# Patient Record
Sex: FEMALE | Race: WHITE | Employment: UNEMPLOYED | URBAN - METROPOLITAN AREA
[De-identification: names, ages, dates, MRNs, and addresses within clinical notes are randomized per-mention and may not be internally consistent; named-entity substitution may affect disease eponyms.]

---

## 2019-01-08 ENCOUNTER — TRANSFERRED RECORDS (OUTPATIENT)
Dept: HEALTH INFORMATION MANAGEMENT | Facility: CLINIC | Age: 34
End: 2019-01-08

## 2019-01-08 ENCOUNTER — MEDICAL CORRESPONDENCE (OUTPATIENT)
Dept: HEALTH INFORMATION MANAGEMENT | Facility: CLINIC | Age: 34
End: 2019-01-08

## 2019-01-09 ENCOUNTER — TRANSCRIBE ORDERS (OUTPATIENT)
Dept: MATERNAL FETAL MEDICINE | Facility: CLINIC | Age: 34
End: 2019-01-09

## 2019-01-09 DIAGNOSIS — O26.90 PREGNANCY RELATED CONDITION, ANTEPARTUM: Primary | ICD-10-CM

## 2019-01-29 ENCOUNTER — PRE VISIT (OUTPATIENT)
Dept: MATERNAL FETAL MEDICINE | Facility: CLINIC | Age: 34
End: 2019-01-29

## 2019-02-06 ENCOUNTER — OFFICE VISIT (OUTPATIENT)
Dept: MATERNAL FETAL MEDICINE | Facility: CLINIC | Age: 34
End: 2019-02-06
Attending: OBSTETRICS & GYNECOLOGY
Payer: MEDICAID

## 2019-02-06 ENCOUNTER — HOSPITAL ENCOUNTER (OUTPATIENT)
Dept: ULTRASOUND IMAGING | Facility: CLINIC | Age: 34
Discharge: HOME | End: 2019-02-06
Attending: OBSTETRICS & GYNECOLOGY | Admitting: OBSTETRICS & GYNECOLOGY
Payer: MEDICAID

## 2019-02-06 DIAGNOSIS — Z36.9 FIRST TRIMESTER SCREENING: Primary | ICD-10-CM

## 2019-02-06 DIAGNOSIS — O28.3 ABNORMAL PRENATAL ULTRASOUND: ICD-10-CM

## 2019-02-06 DIAGNOSIS — O26.90 PREGNANCY RELATED CONDITION, ANTEPARTUM: ICD-10-CM

## 2019-02-06 PROCEDURE — 36415 COLL VENOUS BLD VENIPUNCTURE: CPT | Performed by: OBSTETRICS & GYNECOLOGY

## 2019-02-06 PROCEDURE — 40000791 ZZHCL STATISTIC VERIFI PRENATAL TRISOMY 21,18,13: Performed by: OBSTETRICS & GYNECOLOGY

## 2019-02-06 PROCEDURE — 96040 ZZH GENETIC COUNSELING, EACH 30 MINUTES: CPT | Mod: ZF | Performed by: GENETIC COUNSELOR, MS

## 2019-02-06 PROCEDURE — 76813 OB US NUCHAL MEAS 1 GEST: CPT
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
## 2019-02-06 NOTE — PROGRESS NOTES
"Please see \"Imaging\" tab under \"Chart Review\" for details of today's US.      Liz De Luna, DO  Maternal-Fetal Medicine        "

## 2019-02-12 ENCOUNTER — TELEPHONE (OUTPATIENT)
Dept: MATERNAL FETAL MEDICINE | Facility: CLINIC | Age: 34
End: 2019-02-12

## 2019-02-12 NOTE — TELEPHONE ENCOUNTER
Called and discussed normal NIPT results with Radha. Results indicate NO ANEUPLOIDY DETECTED for chromosomes 21, 18, 13, or sex chromosomes (XX). Fetal sex was not disclosed per patient wishes. This puts her current pregnancy at low risk for Down syndrome, trisomy 18, trisomy 13 and sex chromosome abnormalities. This test is reported to have the following sensitivities: Down syndrome- 99%, trisomy 18- 98%, and trisomy 13- 98%. Although these results are reassuring, this does not replace a standard chromosome analysis from a chorionic villus sampling or amniocentesis. Her results are available in her Epic chart for her primary OB to review.    Brittnee Wolfe MS, [facility]  Maternal Fetal Medicine  [phone]

## 2019-02-14 LAB — LAB SCANNED RESULT: NORMAL

## 2019-02-15 ENCOUNTER — HEALTH MAINTENANCE LETTER (OUTPATIENT)
Age: 34
End: 2019-02-15

## 2019-03-01 ENCOUNTER — OFFICE VISIT (OUTPATIENT)
Dept: MATERNAL FETAL MEDICINE | Facility: CLINIC | Age: 34
End: 2019-03-01
Attending: OBSTETRICS & GYNECOLOGY
Payer: MEDICAID

## 2019-03-01 ENCOUNTER — HOSPITAL ENCOUNTER (OUTPATIENT)
Dept: ULTRASOUND IMAGING | Facility: CLINIC | Age: 34
Discharge: HOME | End: 2019-03-01
Attending: OBSTETRICS & GYNECOLOGY | Admitting: OBSTETRICS & GYNECOLOGY
Payer: MEDICAID

## 2019-03-01 DIAGNOSIS — O28.3 INCREASED NUCHAL TRANSLUCENCY SPACE ON FETAL ULTRASOUND: Primary | ICD-10-CM

## 2019-03-01 PROCEDURE — 76805 OB US >/= 14 WKS SNGL FETUS: CPT

## 2019-03-21 ENCOUNTER — HOSPITAL ENCOUNTER (OUTPATIENT)
Facility: CLINIC | Age: 34
End: 2019-03-21
Payer: COMMERCIAL

## 2019-03-27 ENCOUNTER — HOSPITAL ENCOUNTER (OUTPATIENT)
Facility: CLINIC | Age: 34
Discharge: HOME | End: 2019-03-27
Attending: EMERGENCY MEDICINE | Admitting: MIDWIFE
Payer: MEDICAID

## 2019-03-27 VITALS
OXYGEN SATURATION: 98 % | RESPIRATION RATE: 16 BRPM | DIASTOLIC BLOOD PRESSURE: 65 MMHG | SYSTOLIC BLOOD PRESSURE: 116 MMHG | WEIGHT: 163.6 LBS | TEMPERATURE: 98.4 F | HEART RATE: 83 BPM

## 2019-03-27 DIAGNOSIS — Z33.1 PREGNANCY, INCIDENTAL: Primary | ICD-10-CM

## 2019-03-27 PROBLEM — Z36.89 ENCOUNTER FOR TRIAGE IN PREGNANT PATIENT: Status: ACTIVE | Noted: 2019-03-27

## 2019-03-27 LAB
ALBUMIN UR-MCNC: NEGATIVE MG/DL
APPEARANCE UR: CLEAR
BILIRUB UR QL STRIP: NEGATIVE
COLOR UR AUTO: YELLOW
GLUCOSE UR STRIP-MCNC: NEGATIVE MG/DL
HGB UR QL STRIP: NEGATIVE
KETONES UR STRIP-MCNC: NEGATIVE MG/DL
LEUKOCYTE ESTERASE UR QL STRIP: NEGATIVE
MUCOUS THREADS #/AREA URNS LPF: PRESENT /LPF
NITRATE UR QL: NEGATIVE
PH UR STRIP: 7 PH (ref 5–7)
RBC #/AREA URNS AUTO: <1 /HPF (ref 0–2)
SOURCE: ABNORMAL
SP GR UR STRIP: 1.02 (ref 1–1.03)
SPECIMEN SOURCE: NORMAL
SQUAMOUS #/AREA URNS AUTO: 2 /HPF (ref 0–1)
UROBILINOGEN UR STRIP-MCNC: NORMAL MG/DL (ref 0–2)
WBC #/AREA URNS AUTO: 1 /HPF (ref 0–5)
WET PREP SPEC: NORMAL

## 2019-03-27 PROCEDURE — 81001 URINALYSIS AUTO W/SCOPE: CPT | Performed by: EMERGENCY MEDICINE

## 2019-03-27 PROCEDURE — G0463 HOSPITAL OUTPT CLINIC VISIT: HCPCS

## 2019-03-27 PROCEDURE — 25000132 ZZH RX MED GY IP 250 OP 250 PS 637: Performed by: MIDWIFE

## 2019-03-27 PROCEDURE — 87491 CHLMYD TRACH DNA AMP PROBE: CPT | Performed by: MIDWIFE

## 2019-03-27 PROCEDURE — 40000268 ZZH STATISTIC NO CHARGES

## 2019-03-27 PROCEDURE — 87086 URINE CULTURE/COLONY COUNT: CPT | Performed by: MIDWIFE

## 2019-03-27 PROCEDURE — 87591 N.GONORRHOEAE DNA AMP PROB: CPT | Performed by: MIDWIFE

## 2019-03-27 PROCEDURE — 87210 SMEAR WET MOUNT SALINE/INK: CPT | Performed by: MIDWIFE

## 2019-03-27 RX ORDER — SODIUM CHLORIDE 9 MG/ML
INJECTION, SOLUTION INTRAVENOUS CONTINUOUS
Status: DISCONTINUED | OUTPATIENT
Start: 2019-03-27 | End: 2019-03-27 | Stop reason: HOSPADM

## 2019-03-27 RX ORDER — ACETAMINOPHEN 650 MG/1
650 SUPPOSITORY RECTAL EVERY 4 HOURS PRN
Status: DISCONTINUED | OUTPATIENT
Start: 2019-03-27 | End: 2019-03-27 | Stop reason: HOSPADM

## 2019-03-27 RX ORDER — ACETAMINOPHEN 325 MG/1
650 TABLET ORAL EVERY 4 HOURS PRN
Status: DISCONTINUED | OUTPATIENT
Start: 2019-03-27 | End: 2019-03-27 | Stop reason: HOSPADM

## 2019-03-27 RX ORDER — CETIRIZINE HYDROCHLORIDE 10 MG/1
10 TABLET ORAL 2 TIMES DAILY
COMMUNITY

## 2019-03-27 RX ADMIN — ACETAMINOPHEN 650 MG: 325 TABLET, FILM COATED ORAL at 14:35

## 2019-03-27 NOTE — DISCHARGE INSTRUCTIONS
Discharge Instructions for  Undelivered Patients    You were seen for: Abdominal Pain/cramping  We Consulted: Cookie Chowdhury CNM  You had (Test or Medicine):Vaginal and urine cultures sent.    Diet:  regular    Activity:  * As tolerated  Call your provider if you notice:  * Swelling in your face or increased swelling in your hands or legs.  * Headaches that are not relieved by Tylenol (acetaminophen).  * Changes in your vision (blurring; seeing spots or stars).  * Nausea (sick to your stomach) and vomiting (throwing up).  * Weight gain of 5 pounds per week.  * Heartburn that doesn't go away.  * Signs of bladder infection: Pain when you urinate (use the toilet), needing to go more often or more urgently.  * The bag of mcmanus (membrane) breaks, or you notice leaking in your underwear.  * Bright red blood in your underwear.  * Abdominal (lower belly) or stomach pain.  * Contractions (tightenings) more than 6 times in one hour.  * Increase or change in vaginal discharge (note the color and amount).

## 2019-03-27 NOTE — PROGRESS NOTES
"HOSPITAL TRIAGE NOTE  ===================    CHIEF COMPLAINT  ========================  Radha Quick is a 33 year old patient presenting today at 19w5d for evaluation of abdominal pain.    Patient's last menstrual period was 2018.  Estimated Date of Delivery: Aug 16, 2019       HPI  ==================   Pt reports to the [facility]place triage from her clinic appointment by the recommendation of her healthcare provider.    - She reports that yesterday evening during [employer] class she started experiencing a primary right sided discomfort that was coming and going.  She assumed it was just related to baby position so she continued through completion of the class.  It continued throughout the night but she was to sleep through it and get her normal amount of sleep.  She reports a increase in occurrence today and that is what has brought her in.  - Patient states that she noticed what may have been a yeast infection because she had some \"cottage cheese\" type discharge but it resolved on it own without medication or other interventions this past .    - She denies any LOF, Vaginal bleeding, or other changes in vaginal discharge, and vaginal irritation.  - Pt denies fever, burning or irritation while urinating, increase in urge or frequency to urinate.   - She states that she does attend [employer] 2-3 times a week and works as a  at the [employer].  This is a lot more then she has done during previous pregnancies per her report.      Prenatal record and labs reviewed from [facility], through Care Everywhere.    CONTRACTIONS: cramping and every 8-10 minutes  ABDOMINAL PAIN: moderate ache  FETAL MOVEMENT: active    VAGINAL BLEEDING: none  RUPTURE OF MEMBRANES: no  PELVIC PAIN: none    PREGNANCY COMPLICATIONS: none      # Pain Assessment:  Current Pain Score 3/27/2019   Patient currently in pain? yes   Pain score (0-10) -   Pain location -   Pain descriptors Cramping         REVIEW OF " SYSTEMS  =====================  C: NEGATIVE for fever, chills  I: NEGATIVE for worrisome rashes, moles or lesions  E: NEGATIVE for vision changes or irritation  R: NEGATIVE for significant cough or SOB  CV: NEGATIVE for chest pain, palpitations or varicosities  GI: POSITIVE for abdominal pain generalized but more intense on right side   : NEGATIVE for frequency, dysuria, or hematuria  M: NEGATIVE for significant arthralgias or myalgia  N: NEGATIVE for headache, weakness, dizziness or paresthesias  P: NEGATIVE for changes in mood or affect    PROBLEM LIST  ===============  Patient Active Problem List    Diagnosis Date Noted     Encounter for triage in pregnant patient 03/27/2019     Priority: Medium     Post-dates pregnancy 04/02/2013     Priority: Medium     Encounter for supervision of other normal pregnancy 02/18/2013     Priority: Medium     LMP 6/19/12, TRENT 3/26/13  Diagnosis updated by automated process. Provider to review and confirm.         HISTORIES  ==============  ALLERGIES:    No Known Allergies  PAST MEDICAL HISTORY  Past Medical History:   Diagnosis Date     Asthma      Depressive disorder     history of depression 1.5 years ago     Gonorrhea      SOCIAL HISTORY  Social History     Socioeconomic History     Marital status: Single     Spouse name: Not on file     Number of children: Not on file     Years of education: Not on file     Highest education level: Not on file   Occupational History     Not on file   Social Needs     Financial resource strain: Not on file     Food insecurity:     Worry: Not on file     Inability: Not on file     Transportation needs:     Medical: Not on file     Non-medical: Not on file   Tobacco Use     Smoking status: Former Smoker     Types: Cigarettes     Smokeless tobacco: Never Used   Substance and Sexual Activity     Alcohol use: Yes     Comment: when not pregnat     Drug use: No     Sexual activity: Yes     Partners: Male   Lifestyle     Physical activity:     Days  per week: Not on file     Minutes per session: Not on file     Stress: Not on file   Relationships     Social connections:     Talks on phone: Not on file     Gets together: Not on file     Attends [employer] service: Not on file     Active member of club or organization: Not on file     Attends meetings of clubs or organizations: Not on file     Relationship status: Not on file     Intimate partner violence:     Fear of current or ex partner: Not on file     Emotionally abused: Not on file     Physically abused: Not on file     Forced sexual activity: Not on file   Other Topics Concern     Parent/sibling w/ CABG, MI or angioplasty before 65F 55M? Not Asked   Social History Narrative     Not on file       EMPLOYMENT: [employer]   FAMILY HISTORY  Family History   Problem Relation Age of Onset     Cancer Maternal Grandfather      Cancer Paternal Grandfather      OB HISTORY  Obstetric History       T2      L2     SAB0   TAB0   Ectopic0   Multiple0   Live Births2       # Outcome Date GA Lbr Gerardo/2nd Weight Sex Delivery Anes PTL Lv   3 Current            2 Term 13 41w1d 04:50 / 00:09 3.232 kg (7 lb 2 oz) M Vag-Spont IV REGIONAL N JUAN JOSE      Name: NATO CHAVEZ      Apgar1:  9                Apgar5: 9   1 Term 2010 42w0d 03:00 3.402 kg (7 lb 8 oz) M Vag-Spont   JUAN JOSE        Prenatal Labs:   Lab Results   Component Value Date    ABO A 08/15/2012    RH Positive 08/15/2012    AS Negative 08/15/2012    HEPBANG Non-reactive 08/15/2012    TREPAB Negative 08/15/2012    HGB 10.2 (L) 2013    HIV Non- reactive 08/15/2012     Rubella- Immune        EXAM  ============  /64   Pulse 83   Temp 98.4  F (36.9  C) (Oral)   Resp 16   Wt 74.2 kg (163 lb 9.6 oz)   LMP 2018   SpO2 98%   Breastfeeding? No   GENERAL APPEARANCE: healthy, alert and no distress  RESP: lungs clear to auscultation - no rales, rhonchi or wheezes  BREAST: normal without masses, tenderness or nipple discharge  and no palpable axillary masses or adenopathy  CV: regular rates and rhythm, normal S1 S2, no S3 or S4 and no murmur,and no varicosities  ABDOMEN:  soft, nontender, no epigastric pain  SKIN: no suspicious lesions or rashes  NEURO: Denies headache, blurred vision, other vision changes  PSYCH: mentation appears normal. and affect normal/bright    CONTRACTIONS: cramping and every 8-10 minutes per patient nothing noted on TOCO   FETAL HEART TONES: 145 via Doppler       PELVIC EXAM: closed/ long/ Posterior/ firm   BLOOD: no  DISCHARGE: white and thick    ROM: no  ROMPLUS: not done    LABS: UA, UC, Wet prep and GC/ Chlamydia  Lab results reviewed-   Recent Results (from the past 2 hour(s))   UA with Microscopic    Collection Time: 03/27/19  2:20 PM   Result Value Ref Range    Color Urine Yellow     Appearance Urine Clear     Glucose Urine Negative NEG^Negative mg/dL    Bilirubin Urine Negative NEG^Negative    Ketones Urine Negative NEG^Negative mg/dL    Specific Gravity Urine 1.017 1.003 - 1.035    Blood Urine Negative NEG^Negative    pH Urine 7.0 5.0 - 7.0 pH    Protein Albumin Urine Negative NEG^Negative mg/dL    Urobilinogen mg/dL Normal 0.0 - 2.0 mg/dL    Nitrite Urine Negative NEG^Negative    Leukocyte Esterase Urine Negative NEG^Negative    Source Midstream Urine     WBC Urine 1 0 - 5 /HPF    RBC Urine <1 0 - 2 /HPF    Squamous Epithelial /HPF Urine 2 (H) 0 - 1 /HPF    Mucous Urine Present (A) NEG^Negative /LPF   Urine Culture Aerobic Bacterial    Collection Time: 03/27/19  2:20 PM   Result Value Ref Range    Specimen Description Midstream Urine     Special Requests Specimen received in preservative     Culture Micro PENDING    Wet prep    Collection Time: 03/27/19  2:20 PM   Result Value Ref Range    Specimen Description Vagina     Wet Prep Few  PMNs seen       Wet Prep No yeast seen     Wet Prep No motile Trichomonas seen     Wet Prep No clue cells seen        DIAGNOSIS  ============  19w5d seen on the Birthplace  "Triage for increasing abdominal pain x24 hrs   Muscle Skeletal Injury     FHT: 145 via Doppler     Patient Active Problem List   Diagnosis     Encounter for supervision of other normal pregnancy     Post-dates pregnancy     Encounter for triage in pregnant patient       PLAN  ============  - Discharge to home with PTL instructions per discharge instruction form  - Call or return to the Birthplace with contractions, cramping, abdominal or pelvic pain, vaginal bleeding, leaking fluid or decreased fetal movement.  - Discussed recommendation to rest and relax the remainder of the day using interventions for comfort to include but not limited to hydration, tylenol, and warm baths.  - Dicussed discussing with employer removing the use of heavy equipment from her responsibilities during pregnancy and that if the require a letter from a provider to discuss with her provider a [facility]     - Follow up as scheduled on Tuesday April 2, 2019 and PRN concerns or S/S reviewed previously.  - Patient and her mother verbalize understanding and agreement with this collective plan of care.    \"I, Avery Kolb RN, KEON am serving as a scribe to document services personally performed by ALEJA Escobar, SURAJ based on the provider's statements to me.\" -Avery Kolb RN, KEON    The encounter was performed by me and scribed by the SNM.  The scribed note accurately reflects my personal services and decision made by me.    Cookie Chowdhury CNM APRN   "

## 2019-03-27 NOTE — PLAN OF CARE
"Data: Patient admitted to triage  room 1 at 1317 after evaluation of abdominal cramping. Pt was seen at [facility] and sent to ED then ED sent pt to L&D. Patient is a . Prenatal record reviewed.   Obstetric History       T2      L2     SAB0   TAB0   Ectopic0   Multiple0   Live Births2       # Outcome Date GA Lbr Gerardo/2nd Weight Sex Delivery Anes PTL Lv   3 Current            2 Term 13 41w1d 04:50 / 00:09 3.232 kg (7 lb 2 oz) M Vag-Spont IV REGIONAL N JUAN JOSE      Name: NATO CHAVEZ      Apgar1:  9                Apgar5: 9   1 Term 2010 42w0d 03:00 3.402 kg (7 lb 8 oz) M Vag-Spont   JUAN JOSE      .  Medical History:   Past Medical History:   Diagnosis Date     Asthma      Depressive disorder     history of depression 1.5 years ago     Gonorrhea    .  Gestational age 19w5d. Vital signs per doc flowsheet. Patient reports Abdominal Pain (19 weeks preg. sent from clinic. does derrell regularly. last night developed RLQ pain after derrell. today wakes up with \"all over belly pain\". denies vomiting or diarrhea. + FM. no vaginal discharge. )   as reason for admission. Support persons pt's mother, present.  Action: Care of patient assumed at 1320. Verbal consent for Alem MILTON of . Admission assessment completed  Response: Cookie Chowdhury CNM informed of arrival, orders placed and vaginal and urine cultures sent. Plan per provider is Tylenol for abdominal pain. Send cultures to lab. Patient verbalized understanding of education and verbalized agreement with plan.     U/A negative. Culture results pending, discharged home undelivered with instructions at 1516.  "

## 2019-03-28 ENCOUNTER — NURSE TRIAGE (OUTPATIENT)
Dept: NURSING | Facility: CLINIC | Age: 34
End: 2019-03-28

## 2019-03-28 ENCOUNTER — HOSPITAL ENCOUNTER (OUTPATIENT)
Dept: ULTRASOUND IMAGING | Facility: CLINIC | Age: 34
Discharge: HOME | End: 2019-03-28
Attending: OBSTETRICS & GYNECOLOGY | Admitting: OBSTETRICS & GYNECOLOGY
Payer: MEDICAID

## 2019-03-28 ENCOUNTER — OFFICE VISIT (OUTPATIENT)
Dept: MATERNAL FETAL MEDICINE | Facility: CLINIC | Age: 34
End: 2019-03-28
Attending: OBSTETRICS & GYNECOLOGY
Payer: MEDICAID

## 2019-03-28 DIAGNOSIS — O28.3 INCREASED NUCHAL TRANSLUCENCY SPACE ON FETAL ULTRASOUND: Primary | ICD-10-CM

## 2019-03-28 DIAGNOSIS — O28.3 INCREASED NUCHAL TRANSLUCENCY SPACE ON FETAL ULTRASOUND: ICD-10-CM

## 2019-03-28 LAB
BACTERIA SPEC CULT: NORMAL
BACTERIA SPEC CULT: NORMAL
C TRACH DNA SPEC QL NAA+PROBE: NEGATIVE
Lab: NORMAL
N GONORRHOEA DNA SPEC QL NAA+PROBE: NEGATIVE
SPECIMEN SOURCE: NORMAL

## 2019-03-28 PROCEDURE — 76811 OB US DETAILED SNGL FETUS: CPT

## 2019-03-28 NOTE — PROGRESS NOTES
"Please see \"Imaging\" tab under Chart Review for full details.    Genevieve Zamarripa MD  Maternal Fetal Medicine    "

## 2019-03-28 NOTE — TELEPHONE ENCOUNTER
Radha was seen in OB yesterday for abdominal pain. No significant findings so she was discharged.  She was instructed upon discharge to give it 24 hours and see how it is.  Per instructions if pain persists call or return to the [facility]Place.  Radha continues to have abdominal pain. Yesterday the pain was on the right side of her abdomen. It's now her upper abdomen. She stated, even with Tylenol on board, her pain is 7/10.  I recommended she return to the [facility]Place. She stated understanding and agreement.  Cary SCHAEFFER RN [facility]

## 2019-04-10 ENCOUNTER — OFFICE VISIT (OUTPATIENT)
Dept: MATERNAL FETAL MEDICINE | Facility: CLINIC | Age: 34
End: 2019-04-10
Attending: OBSTETRICS & GYNECOLOGY
Payer: MEDICAID

## 2019-04-10 ENCOUNTER — HOSPITAL ENCOUNTER (OUTPATIENT)
Dept: ULTRASOUND IMAGING | Facility: CLINIC | Age: 34
Discharge: HOME | End: 2019-04-10
Attending: OBSTETRICS & GYNECOLOGY | Admitting: OBSTETRICS & GYNECOLOGY
Payer: MEDICAID

## 2019-04-10 DIAGNOSIS — O28.3 INCREASED NUCHAL TRANSLUCENCY SPACE ON FETAL ULTRASOUND: Primary | ICD-10-CM

## 2019-04-10 DIAGNOSIS — O28.3 INCREASED NUCHAL TRANSLUCENCY SPACE ON FETAL ULTRASOUND: ICD-10-CM

## 2019-04-10 PROCEDURE — 76825 ECHO EXAM OF FETAL HEART: CPT

## 2019-04-10 NOTE — PROGRESS NOTES
"Please see \"Imaging\" tab under \"Chart Review\" for details of today's US.    Jeannette Jalloh, DO    "

## 2019-05-14 LAB
GLU GEST SCREEN 1HR 50G: 119
TREPONEMA ANTIBODIES: NORMAL

## 2019-07-16 LAB — GROUP B STREP PCR: NEGATIVE

## 2019-07-22 LAB — HIV 1+2 AB+HIV1 P24 AG SERPL QL IA: NORMAL

## 2019-08-23 ENCOUNTER — HOSPITAL ENCOUNTER (INPATIENT)
Facility: CLINIC | Age: 34
LOS: 2 days | Discharge: HOME | End: 2019-08-25
Attending: ADVANCED PRACTICE MIDWIFE | Admitting: FAMILY MEDICINE
Payer: COMMERCIAL

## 2019-08-23 PROBLEM — E55.9 VITAMIN D INSUFFICIENCY: Status: ACTIVE | Noted: 2019-01-15

## 2019-08-23 PROBLEM — Z34.80 SUPERVISION OF OTHER NORMAL PREGNANCY, ANTEPARTUM: Status: ACTIVE | Noted: 2018-12-26

## 2019-08-23 PROBLEM — J30.89 ENVIRONMENTAL AND SEASONAL ALLERGIES: Status: ACTIVE | Noted: 2018-10-23

## 2019-08-23 PROBLEM — Z86.59 HISTORY OF POSTPARTUM DEPRESSION, CURRENTLY PREGNANT: Status: ACTIVE | Noted: 2019-07-02

## 2019-08-23 PROBLEM — F32.2 MAJOR DEPRESSIVE DISORDER, SINGLE EPISODE, SEVERE WITHOUT PSYCHOTIC FEATURES (H): Status: ACTIVE | Noted: 2017-05-22

## 2019-08-23 PROBLEM — O99.891 HISTORY OF POSTPARTUM DEPRESSION, CURRENTLY PREGNANT: Status: ACTIVE | Noted: 2019-07-02

## 2019-08-23 PROBLEM — O28.9 ABNORMAL FIRST TRIMESTER SCREEN: Status: ACTIVE | Noted: 2019-03-01

## 2019-08-23 PROBLEM — A04.8 HELICOBACTER PYLORI INFECTION: Status: ACTIVE | Noted: 2019-04-01

## 2019-08-23 PROBLEM — Z34.83 SUPERVISION OF NORMAL INTRAUTERINE PREGNANCY IN MULTIGRAVIDA IN THIRD TRIMESTER: Status: ACTIVE | Noted: 2019-08-23

## 2019-08-23 LAB
ABO + RH BLD: NORMAL
ABO + RH BLD: NORMAL
BASOPHILS # BLD AUTO: 0 10E9/L (ref 0–0.2)
BASOPHILS NFR BLD AUTO: 0.1 %
BLD GP AB SCN SERPL QL: NORMAL
BLOOD BANK CMNT PATIENT-IMP: NORMAL
DIFFERENTIAL METHOD BLD: ABNORMAL
EOSINOPHIL # BLD AUTO: 0.1 10E9/L (ref 0–0.7)
EOSINOPHIL NFR BLD AUTO: 1.2 %
ERYTHROCYTE [DISTWIDTH] IN BLOOD BY AUTOMATED COUNT: 13.3 % (ref 10–15)
HCT VFR BLD AUTO: 33.5 % (ref 35–47)
HGB BLD-MCNC: 11.2 G/DL (ref 11.7–15.7)
IMM GRANULOCYTES # BLD: 0 10E9/L (ref 0–0.4)
IMM GRANULOCYTES NFR BLD: 0.4 %
LYMPHOCYTES # BLD AUTO: 2.3 10E9/L (ref 0.8–5.3)
LYMPHOCYTES NFR BLD AUTO: 24.6 %
MCH RBC QN AUTO: 30.2 PG (ref 26.5–33)
MCHC RBC AUTO-ENTMCNC: 33.4 G/DL (ref 31.5–36.5)
MCV RBC AUTO: 90 FL (ref 78–100)
MONOCYTES # BLD AUTO: 0.4 10E9/L (ref 0–1.3)
MONOCYTES NFR BLD AUTO: 4.6 %
NEUTROPHILS # BLD AUTO: 6.5 10E9/L (ref 1.6–8.3)
NEUTROPHILS NFR BLD AUTO: 69.1 %
NRBC # BLD AUTO: 0 10*3/UL
NRBC BLD AUTO-RTO: 0 /100
PLATELET # BLD AUTO: 196 10E9/L (ref 150–450)
RBC # BLD AUTO: 3.71 10E12/L (ref 3.8–5.2)
SPECIMEN EXP DATE BLD: NORMAL
WBC # BLD AUTO: 9.4 10E9/L (ref 4–11)

## 2019-08-23 PROCEDURE — 25000132 ZZH RX MED GY IP 250 OP 250 PS 637: Performed by: STUDENT IN AN ORGANIZED HEALTH CARE EDUCATION/TRAINING PROGRAM

## 2019-08-23 PROCEDURE — 86900 BLOOD TYPING SEROLOGIC ABO: CPT | Performed by: FAMILY MEDICINE

## 2019-08-23 PROCEDURE — 25000132 ZZH RX MED GY IP 250 OP 250 PS 637: Performed by: ADVANCED PRACTICE MIDWIFE

## 2019-08-23 PROCEDURE — 25000128 H RX IP 250 OP 636: Performed by: STUDENT IN AN ORGANIZED HEALTH CARE EDUCATION/TRAINING PROGRAM

## 2019-08-23 PROCEDURE — 12000001 ZZH R&B MED SURG/OB UMMC

## 2019-08-23 PROCEDURE — 86850 RBC ANTIBODY SCREEN: CPT | Performed by: FAMILY MEDICINE

## 2019-08-23 PROCEDURE — 85025 COMPLETE CBC W/AUTO DIFF WBC: CPT | Performed by: FAMILY MEDICINE

## 2019-08-23 PROCEDURE — 86780 TREPONEMA PALLIDUM: CPT | Performed by: FAMILY MEDICINE

## 2019-08-23 PROCEDURE — 3E0P7VZ INTRODUCTION OF HORMONE INTO FEMALE REPRODUCTIVE, VIA NATURAL OR ARTIFICIAL OPENING: ICD-10-PCS | Performed by: ADVANCED PRACTICE MIDWIFE

## 2019-08-23 PROCEDURE — 86901 BLOOD TYPING SEROLOGIC RH(D): CPT | Performed by: FAMILY MEDICINE

## 2019-08-23 PROCEDURE — 87653 STREP B DNA AMP PROBE: CPT | Performed by: ADVANCED PRACTICE MIDWIFE

## 2019-08-23 RX ORDER — CARBOPROST TROMETHAMINE 250 UG/ML
250 INJECTION, SOLUTION INTRAMUSCULAR
Status: DISCONTINUED | OUTPATIENT
Start: 2019-08-23 | End: 2019-08-24

## 2019-08-23 RX ORDER — ONDANSETRON 2 MG/ML
4 INJECTION INTRAMUSCULAR; INTRAVENOUS EVERY 6 HOURS PRN
Status: DISCONTINUED | OUTPATIENT
Start: 2019-08-23 | End: 2019-08-24

## 2019-08-23 RX ORDER — ACETAMINOPHEN 325 MG/1
650 TABLET ORAL EVERY 4 HOURS PRN
COMMUNITY
Start: 2019-07-16

## 2019-08-23 RX ORDER — SODIUM CHLORIDE, SODIUM LACTATE, POTASSIUM CHLORIDE, CALCIUM CHLORIDE 600; 310; 30; 20 MG/100ML; MG/100ML; MG/100ML; MG/100ML
INJECTION, SOLUTION INTRAVENOUS CONTINUOUS
Status: DISCONTINUED | OUTPATIENT
Start: 2019-08-23 | End: 2019-08-24

## 2019-08-23 RX ORDER — CHOLECALCIFEROL (VITAMIN D3) 50 MCG
2000 TABLET ORAL DAILY
COMMUNITY
Start: 2019-01-15 | End: 2021-07-22

## 2019-08-23 RX ORDER — FERROUS SULFATE 325(65) MG
325 TABLET ORAL
Status: DISCONTINUED | OUTPATIENT
Start: 2019-08-24 | End: 2019-08-25 | Stop reason: HOSPADM

## 2019-08-23 RX ORDER — FENTANYL CITRATE 50 UG/ML
50-100 INJECTION, SOLUTION INTRAMUSCULAR; INTRAVENOUS
Status: DISCONTINUED | OUTPATIENT
Start: 2019-08-23 | End: 2019-08-24

## 2019-08-23 RX ORDER — HYDROXYZINE HYDROCHLORIDE 50 MG/1
100 TABLET, FILM COATED ORAL
Status: COMPLETED | OUTPATIENT
Start: 2019-08-23 | End: 2019-08-23

## 2019-08-23 RX ORDER — MISOPROSTOL 100 UG/1
25 TABLET ORAL EVERY 4 HOURS PRN
Status: DISCONTINUED | OUTPATIENT
Start: 2019-08-23 | End: 2019-08-24

## 2019-08-23 RX ORDER — IBUPROFEN 200 MG
400-600 TABLET ORAL EVERY 6 HOURS PRN
COMMUNITY
Start: 2019-07-16 | End: 2021-07-22

## 2019-08-23 RX ORDER — FERROUS SULFATE 325(65) MG
325 TABLET ORAL DAILY
COMMUNITY
Start: 2019-05-17 | End: 2021-07-22

## 2019-08-23 RX ORDER — MORPHINE SULFATE 10 MG/ML
10 INJECTION, SOLUTION INTRAMUSCULAR; INTRAVENOUS
Status: DISCONTINUED | OUTPATIENT
Start: 2019-08-23 | End: 2019-08-24

## 2019-08-23 RX ORDER — METHYLERGONOVINE MALEATE 0.2 MG/ML
200 INJECTION INTRAVENOUS
Status: DISCONTINUED | OUTPATIENT
Start: 2019-08-23 | End: 2019-08-24

## 2019-08-23 RX ORDER — OLOPATADINE HYDROCHLORIDE 1 MG/ML
1 SOLUTION/ DROPS OPHTHALMIC 2 TIMES DAILY
COMMUNITY
Start: 2018-03-29 | End: 2021-07-22

## 2019-08-23 RX ORDER — NALOXONE HYDROCHLORIDE 0.4 MG/ML
.1-.4 INJECTION, SOLUTION INTRAMUSCULAR; INTRAVENOUS; SUBCUTANEOUS
Status: DISCONTINUED | OUTPATIENT
Start: 2019-08-23 | End: 2019-08-24

## 2019-08-23 RX ORDER — HYDROXYZINE HYDROCHLORIDE 50 MG/ML
50-100 INJECTION, SOLUTION INTRAMUSCULAR
Status: DISCONTINUED | OUTPATIENT
Start: 2019-08-23 | End: 2019-08-24

## 2019-08-23 RX ORDER — CETIRIZINE HYDROCHLORIDE 10 MG/1
10 TABLET ORAL DAILY
Status: ON HOLD | COMMUNITY
End: 2019-08-23

## 2019-08-23 RX ORDER — OLOPATADINE HYDROCHLORIDE 1 MG/ML
1 SOLUTION/ DROPS OPHTHALMIC 2 TIMES DAILY
Status: DISCONTINUED | OUTPATIENT
Start: 2019-08-23 | End: 2019-08-25 | Stop reason: HOSPADM

## 2019-08-23 RX ORDER — OXYTOCIN/0.9 % SODIUM CHLORIDE 30/500 ML
100-340 PLASTIC BAG, INJECTION (ML) INTRAVENOUS CONTINUOUS PRN
Status: COMPLETED | OUTPATIENT
Start: 2019-08-23 | End: 2019-08-24

## 2019-08-23 RX ORDER — IBUPROFEN 800 MG/1
800 TABLET, FILM COATED ORAL
Status: COMPLETED | OUTPATIENT
Start: 2019-08-23 | End: 2019-08-24

## 2019-08-23 RX ORDER — CETIRIZINE HYDROCHLORIDE 10 MG/1
10 TABLET ORAL 2 TIMES DAILY
Status: DISCONTINUED | OUTPATIENT
Start: 2019-08-23 | End: 2019-08-25 | Stop reason: HOSPADM

## 2019-08-23 RX ORDER — OXYTOCIN 10 [USP'U]/ML
10 INJECTION, SOLUTION INTRAMUSCULAR; INTRAVENOUS
Status: DISCONTINUED | OUTPATIENT
Start: 2019-08-23 | End: 2019-08-24

## 2019-08-23 RX ORDER — NICOTINE POLACRILEX 4 MG/1
20 GUM, CHEWING ORAL 2 TIMES DAILY
COMMUNITY
Start: 2019-04-10 | End: 2021-07-22

## 2019-08-23 RX ORDER — LIDOCAINE 40 MG/G
CREAM TOPICAL
Status: DISCONTINUED | OUTPATIENT
Start: 2019-08-23 | End: 2019-08-24

## 2019-08-23 RX ORDER — DOCUSATE SODIUM 100 MG/1
100 CAPSULE, LIQUID FILLED ORAL 2 TIMES DAILY PRN
COMMUNITY
Start: 2013-04-11 | End: 2021-07-22

## 2019-08-23 RX ORDER — FERROUS SULFATE 325(65) MG
325 TABLET ORAL
COMMUNITY
End: 2021-07-22

## 2019-08-23 RX ORDER — ACETAMINOPHEN 325 MG/1
650 TABLET ORAL EVERY 4 HOURS PRN
Status: DISCONTINUED | OUTPATIENT
Start: 2019-08-23 | End: 2019-08-24

## 2019-08-23 RX ORDER — OXYCODONE AND ACETAMINOPHEN 5; 325 MG/1; MG/1
1 TABLET ORAL
Status: DISCONTINUED | OUTPATIENT
Start: 2019-08-23 | End: 2019-08-25 | Stop reason: HOSPADM

## 2019-08-23 RX ORDER — MISOPROSTOL 100 UG/1
25 TABLET ORAL
Status: DISCONTINUED | OUTPATIENT
Start: 2019-08-23 | End: 2019-08-23

## 2019-08-23 RX ADMIN — Medication 25 MCG: at 16:44

## 2019-08-23 RX ADMIN — Medication 25 MCG: at 10:22

## 2019-08-23 RX ADMIN — HYDROXYZINE HYDROCHLORIDE 100 MG: 50 TABLET, FILM COATED ORAL at 23:39

## 2019-08-23 RX ADMIN — MORPHINE SULFATE 10 MG: 10 INJECTION INTRAVENOUS at 23:39

## 2019-08-23 RX ADMIN — Medication 25 MCG: at 14:27

## 2019-08-23 RX ADMIN — OMEPRAZOLE 20 MG: 20 CAPSULE, DELAYED RELEASE ORAL at 13:49

## 2019-08-23 RX ADMIN — CETIRIZINE HYDROCHLORIDE 10 MG: 10 TABLET, FILM COATED ORAL at 13:50

## 2019-08-23 RX ADMIN — Medication 25 MCG: at 12:38

## 2019-08-23 NOTE — PROGRESS NOTES
[facility]  Intrapartum Progress Note  2019 1:02 PM  Date of service: 2019.    SUBJECTIVE:  Doing well. Patient reports contractions infrequently (not painful) and denies vaginal bleeding or loss of fluids. Fetal movement is Normal. She has been getting up and walking around and doing squats to get things moving.    OBJECTIVE:   Patient Vitals for the past 8 hrs:   BP Temp Temp src Heart Rate Resp   19 1240 129/79 -- -- 90 18   19 0848 117/64 -- -- -- --   19 0839 -- 98.4  F (36.9  C) Oral -- 16     Gen: no apparent distress, resting comfortably    Sterile Vaginal Exam:  Membranes: intact  Cervix: not checked  Presentation:Cephalic    FHT: Baseline 150 bpm. Variability is  moderate (5-25 bpm),  Accelerations are Present, decelerations are Absent  TOCO: Contractions infrequently.  Tracing is Category 1          ASSESSMENT and PLAN:  Radha Quick is a 34 year old  at 41w0d not in labor. Membranes are intact.   Patient Active Problem List   Diagnosis     Encounter for supervision of other normal pregnancy     Post-dates pregnancy     Encounter for triage in pregnant patient     Supervision of normal intrauterine pregnancy in multigravida in third trimester     Environmental and seasonal allergies     Helicobacter pylori infection     History of postpartum depression, currently pregnant     Major depressive disorder, single episode, severe without psychotic features (H)     Bety     Vitamin D insufficiency     Supervision of other normal pregnancy, antepartum     Abnormal first trimester screen       1.  Labor: induced with cytotec for 2 doses.       Continue expectant management.  Plan to reassess in two hours.   2.  Fetal Heart Rate Tracing: category one  3.  GBS negative.  Antibiotics are not indicated.  4.  Pain Management: Not currently in pain    # Pain Assessment:  Current Pain Score 2019   Patient currently in pain? denies   Pain score  (0-10) -   Pain location -   Pain descriptors -   Radha s pain level was assessed and she currently denies pain.        Mario Ojeda MD

## 2019-08-23 NOTE — PLAN OF CARE
Data: Radha Quick admitted for induction of labor with cervical ripening for postdates. EFM applied, NST reactive. Cervix 0/30/-2. Late deceleration noted ~1335, spontaneous resolution, baby reactive following that.   Intervention: Misoprostol given PO q 2 hours, x3 doses.    Plan: Continuous EFM, vital signs as ordered, assess for signs of uterine tachysystole.

## 2019-08-23 NOTE — PROVIDER NOTIFICATION
08/23/19 1500   Provider Notification   Provider Name/Title Dr Ojeda   Method of Notification In Department   Notification Reason Status Update     Patient received third misoprostol PO. One late deceleration noted ~1330, reactive since that time. Will continue to monitor and support.

## 2019-08-23 NOTE — PROGRESS NOTES
[facility]  Intrapartum Progress Note  2019 3:31 PM  Date of service: 2019.    SUBJECTIVE:  Doing well. Patient reports contractions occasionally and denies vaginal bleeding or loss of fluids. Fetal movement is Normal.    OBJECTIVE:   Patient Vitals for the past 8 hrs:   BP Temp Temp src Pulse Heart Rate Resp   19 1429 122/68 97.7  F (36.5  C) Oral 76 -- 16   19 1240 129/79 -- -- -- 90 18   19 0848 117/64 -- -- -- -- --   19 0839 -- 98.4  F (36.9  C) Oral -- -- 16     Gen: no apparent distress, resting comfortably  Abd: Gravid.    Sterile Vaginal Exam:  Membranes: intact  Cervix: 0.5// posterior   Consistency: average  Presentation:Cephalic    FHT: Baseline 140 bpm. Variability is  moderate (5-25 bpm),  Accelerations are Present, decelerations are Present (1 prolonged decel around 1330 with good tracing since)  TOCO: Contractions every 5-15 minutes.  Tracing is Category 2        .    ASSESSMENT and PLAN:  Radha Quick is a 34 year old  at 41w0d not in labor.   Patient Active Problem List   Diagnosis     Encounter for supervision of other normal pregnancy     Post-dates pregnancy     Encounter for triage in pregnant patient     Supervision of normal intrauterine pregnancy in multigravida in third trimester     Environmental and seasonal allergies     Helicobacter pylori infection     History of postpartum depression, currently pregnant     Major depressive disorder, single episode, severe without psychotic features (H)     Bety     Vitamin D insufficiency     Supervision of other normal pregnancy, antepartum     Abnormal first trimester screen       1.  Labor: induced with oral cytotec for 3 doses. minimal/no progress.    - Will transition to vaginal cytotec now and consider khan catheter if further dilation seen with vaginal miso       Continue expectant management.  Plan to reassess in two hours.   2.  IM Morphine and Vistaril ordered  at bedtime prn for sleep overnight  3.  Fetal Heart Rate Tracing: category two (one isolated late decel at 1330, otherwise great, reactive tracing)  4.  GBS negative.  Antibiotics are not indicated.  5.  Pain Management: Still undecided    # Pain Assessment:  Current Pain Score 8/23/2019   Patient currently in pain? denies   Pain score (0-10) -   Pain location -   Pain descriptors -   Radha salinas pain level was assessed and she currently denies pain.        Mario Ojeda MD     I agree with the PFSH and ROS as completed by the student, except for changes made by me. The remainder of the encounter was performed by me and scribed by the student. The scribed note accurately reflects my personal services and decisions made by me.  Eri Guillen, SILVANO, CNM, APRN

## 2019-08-23 NOTE — PROGRESS NOTES
Data: Patient admitted to room 458 at 0800. Patient is a . Prenatal record reviewed.   OB History    Para Term  AB Living   3 2 2 0 0 2   SAB TAB Ectopic Multiple Live Births   0 0 0 0 2      # Outcome Date GA Lbr Gerardo/2nd Weight Sex Delivery Anes PTL Lv   3 Current            2 Term 13 41w1d 04:50 / 00:09 3.232 kg (7 lb 2 oz) M Vag-Spont IV REGIONAL N JUAN JOSE      Name: NATO CHAVEZ      Apgar1: 9  Apgar5: 9   1 Term 2010 42w0d 03:00 3.402 kg (7 lb 8 oz) M Vag-Spont   JUAN JOSE      Birth Comments: NICU x 3 days   .  Medical History:   Past Medical History:   Diagnosis Date     Asthma      Depressive disorder     history of depression 1.5 years ago     Gonorrhea    .  Gestational age 41w0d. Vital signs per doc flowsheet. Fetal movement present. Patient presents with Induction Of Labor (post dates)   as reason for admission. Support persons Monty (significant other) and Antony (child) present.  Action:  Verbal consent for EFM, external fetal monitors applied. Admission assessment completed. Patient and support persons educated on labor process. Patient instructed to report change in fetal movement, contractions, vaginal leaking of fluid or bleeding, abdominal pain, or any concerns related to the pregnancy to her nurse/physician. Patient oriented to room, call light in reach.   Response:  Patient verbalized understanding of education and verbalized agreement with plan.

## 2019-08-23 NOTE — H&P
[facility]  Ob Admit /Triage Note    Radha Quick MRN# [id number]   Age: 34 year old YOB: 1985     Date of Admission: 2019 10:01 AM  Date of service: 2019.       History of Present Illness (Resident / Clinician):   Radha Quick is a patient of [facility].   She is a 34 year old  who is 41w0d pregnant with TRENT  Aug 16, 2019, by Patient's last menstrual period was 2018. that is consistent with 6 week ultrasound.    [facility] for [facility] of labor, indication post-dates.    She reports irregular contractions starting yesterday, and occurring every 15 minutes for 3 hour periods of time. She denies fluid leakage. She denies bleeding per vagina. Fetal movement is normal. Seen in clinic one week ago with cervical check at 0.5/30/-4, cephalic by leopold's at that time.    Her prenatal course has been complicated by  Increased nuchal lucency seen on ultrasound but evaluated by MFM and had normal free cell testing, and by H. Pylori diagnosed in pregnancy treated with Omeprazole.    Prenatal labs   Lab Results   Component Value Date    ABO A 08/15/2012    RH Positive 08/15/2012    AS Negative 08/15/2012    HEPBANG Non-reactive 08/15/2012    CHPCRT Negative 2019    GCPCRT Negative 2019    TREPAB Negative 08/15/2012    RUBELLAABIGG Positive 08/15/2012    HGB 10.2 (L) 2013    HIV Non- reactive 08/15/2012    GBS Negative 2013       GBS was collected on 2019.  Weight gain during pregnancy:  25 lbs.              Obstetrical History:   She is a 34 year old   Her OB history:   OB History    Para Term  AB Living   3 2 2 0 0 2   SAB TAB Ectopic Multiple Live Births   0 0 0 0 2      # Outcome Date GA Lbr Gerardo/2nd Weight Sex Delivery Anes PTL Lv   3 Current            2 Term 13 41w1d 04:50 / 00:09 3.232 kg (7 lb 2 oz) M Vag-Spont IV REGIONAL N  JUAN JOSE      Name: NATO CHAVEZ      Apgar1: 9  Apgar5: 9   1 Term 01/2010 42w0d 03:00 3.402 kg (7 lb 8 oz) M Vag-Spont   JUAN JOSE      Birth Comments: NICU x 3 days              Immunzations:     Most Recent Immunizations   Administered Date(s) Administered     TDAP Vaccine (Boostrix) 03/25/2013     Tdap this pregnancy?: YES - Date: 6/18/2019  Flu shot this pregnancy? No, but given just prior to pregnancy in 10/2018         Past Medical History:     Past Medical History:   Diagnosis Date     Asthma      Depressive disorder     history of depression 1.5 years ago     Gonorrhea             Past Surgical History:   History reviewed. No pertinent surgical history.         Family History:     Family History   Problem Relation Age of Onset     Cancer Maternal Grandfather      Cancer Paternal Grandfather             Social History:   no tobacco use  no alcohol use  no illicit drug use         Medications:     No current facility-administered medications on file prior to encounter.   Current Outpatient Medications on File Prior to Encounter:  cetirizine (ZYRTEC) 10 MG tablet Take 10 mg by mouth 2 times daily    omeprazole 20 MG tablet Take 20 mg by mouth 2 times daily   Prenatal vitamin  s (DIS) TABS Take 1 tablet by mouth daily.   order for [facility] order            Allergies:   Patient has no known allergies.         Review of Systems:   CONSTITUTIONAL: no fatigue, no unexpected change in weight  SKIN: no worrisome rashes or lesions  EYES: no acute vision problems or changes  ENT: no ear problems, no mouth problems, no throat problems  RESP: no significant cough, no shortness of breath  CV: no chest pain, no palpitations, no new or worsening peripheral edema  GI: positive for abdominal pain, negative for nausea or vomiting  : no frequency, no dysuria, no hematuria, no discharge, vaginal bleeding, loss of fluid  NEURO: no weakness, no dizziness, no headaches  ENDOCRINE: no temperature intolerance, no skin/hair  changes  PSYCHIATRIC: NEGATIVE for changes in mood or trouble with sleep         Physical Exam:   Vitals:   Temp 98.4  F (36.9  C) (Oral)   Resp 16   LMP 2018   0 lbs 0 oz  There is no height or weight on file to calculate BMI.    GEN: Awake, alert in no apparent distress   HEENT: grossly normal  RESPIRATORY: clear to auscultation bilaterally, no increased work of breathing  BACK:  no costovertebral angle tenderness   CARDIOVASCULAR: RRR, no murmur  ABDOMEN: gravid,  vertex by Leopold's  PELVIC:  no fluid noted, no blood noted.  Presenting part: Head.  Cervix: 0.5/30/-4  EXT:  no edema or calf tenderness  EFW on Exam: 3000g  Confirmed VTX by Ultrasound? Yes    NST interpretation:  Ordered by  Denis Ojeda MD; PGY-2  Start time: 0955   Stop time: 1015  External Monitor, FHT: Baseline 145 bpm. Variability is  moderate (5-25 bpm),  Accelerations are Present, decelerations are Absent. TOCO: Contractions infrequent. Tracing is Category 1.  Interpretation: reactive      Assessment and Plan:   Assessment:   Radha Quick is a 34 year old  at 41w0d not in labor. Membranes are intact. GBS status is Negative.   Patient Active Problem List   Diagnosis     Encounter for supervision of other normal pregnancy     Post-dates pregnancy     Encounter for triage in pregnant patient     Supervision of normal intrauterine pregnancy in multigravida in third trimester         Plan:   - Admit - see IP orders  cervical ripening with oral misoprostol  Anticipate   - Will notify Clinic, [facility]  - Pain: undecided and not currently in uncomfortable pain, has used both epidural and fentanyl in the past    # Pain Assessment:  Current Pain Score 2019   Patient currently in pain? no   Pain score (0-10) -   Pain location -   Pain descriptors -   - Radha is experiencing pain due to labor. Pain management was discussed and the plan was created in a collaborative fashion.  Radha's response to  the current recommendations: engaged  - Please see the plan for pain management as documented above        Mario Ojeda MD

## 2019-08-24 LAB
ALT SERPL W P-5'-P-CCNC: 26 U/L (ref 0–50)
AST SERPL W P-5'-P-CCNC: 30 U/L (ref 0–45)
CREAT SERPL-MCNC: 0.42 MG/DL (ref 0.52–1.04)
ERYTHROCYTE [DISTWIDTH] IN BLOOD BY AUTOMATED COUNT: 13.1 % (ref 10–15)
GFR SERPL CREATININE-BSD FRML MDRD: >90 ML/MIN/{1.73_M2}
GP B STREP DNA SPEC QL NAA+PROBE: NEGATIVE
HCT VFR BLD AUTO: 34.7 % (ref 35–47)
HGB BLD-MCNC: 11.8 G/DL (ref 11.7–15.7)
MCH RBC QN AUTO: 30.5 PG (ref 26.5–33)
MCHC RBC AUTO-ENTMCNC: 34 G/DL (ref 31.5–36.5)
MCV RBC AUTO: 90 FL (ref 78–100)
PLATELET # BLD AUTO: 205 10E9/L (ref 150–450)
RBC # BLD AUTO: 3.87 10E12/L (ref 3.8–5.2)
SPECIMEN SOURCE: NORMAL
T PALLIDUM AB SER QL: NONREACTIVE
URATE SERPL-MCNC: 4.4 MG/DL (ref 2.6–6)
WBC # BLD AUTO: 24 10E9/L (ref 4–11)

## 2019-08-24 PROCEDURE — 12000001 ZZH R&B MED SURG/OB UMMC

## 2019-08-24 PROCEDURE — 25000128 H RX IP 250 OP 636: Performed by: STUDENT IN AN ORGANIZED HEALTH CARE EDUCATION/TRAINING PROGRAM

## 2019-08-24 PROCEDURE — 25000132 ZZH RX MED GY IP 250 OP 250 PS 637: Performed by: ADVANCED PRACTICE MIDWIFE

## 2019-08-24 PROCEDURE — 84450 TRANSFERASE (AST) (SGOT): CPT | Performed by: ADVANCED PRACTICE MIDWIFE

## 2019-08-24 PROCEDURE — 84460 ALANINE AMINO (ALT) (SGPT): CPT | Performed by: ADVANCED PRACTICE MIDWIFE

## 2019-08-24 PROCEDURE — 85027 COMPLETE CBC AUTOMATED: CPT | Performed by: ADVANCED PRACTICE MIDWIFE

## 2019-08-24 PROCEDURE — 36415 COLL VENOUS BLD VENIPUNCTURE: CPT | Performed by: ADVANCED PRACTICE MIDWIFE

## 2019-08-24 PROCEDURE — 25000132 ZZH RX MED GY IP 250 OP 250 PS 637: Performed by: STUDENT IN AN ORGANIZED HEALTH CARE EDUCATION/TRAINING PROGRAM

## 2019-08-24 PROCEDURE — 25000125 ZZHC RX 250: Performed by: STUDENT IN AN ORGANIZED HEALTH CARE EDUCATION/TRAINING PROGRAM

## 2019-08-24 PROCEDURE — 84550 ASSAY OF BLOOD/URIC ACID: CPT | Performed by: ADVANCED PRACTICE MIDWIFE

## 2019-08-24 PROCEDURE — 72200001 ZZH LABOR CARE VAGINAL DELIVERY SINGLE

## 2019-08-24 PROCEDURE — 25000128 H RX IP 250 OP 636: Performed by: ADVANCED PRACTICE MIDWIFE

## 2019-08-24 PROCEDURE — 25800030 ZZH RX IP 258 OP 636: Performed by: STUDENT IN AN ORGANIZED HEALTH CARE EDUCATION/TRAINING PROGRAM

## 2019-08-24 PROCEDURE — 25800030 ZZH RX IP 258 OP 636: Performed by: ADVANCED PRACTICE MIDWIFE

## 2019-08-24 PROCEDURE — 25000125 ZZHC RX 250: Performed by: ADVANCED PRACTICE MIDWIFE

## 2019-08-24 PROCEDURE — 82565 ASSAY OF CREATININE: CPT | Performed by: ADVANCED PRACTICE MIDWIFE

## 2019-08-24 RX ORDER — IBUPROFEN 800 MG/1
800 TABLET, FILM COATED ORAL EVERY 6 HOURS PRN
Status: DISCONTINUED | OUTPATIENT
Start: 2019-08-25 | End: 2019-08-25 | Stop reason: HOSPADM

## 2019-08-24 RX ORDER — LANOLIN 100 %
OINTMENT (GRAM) TOPICAL
Status: DISCONTINUED | OUTPATIENT
Start: 2019-08-24 | End: 2019-08-25 | Stop reason: HOSPADM

## 2019-08-24 RX ORDER — SODIUM CHLORIDE 9 MG/ML
INJECTION, SOLUTION INTRAVENOUS
Status: DISCONTINUED
Start: 2019-08-24 | End: 2019-08-24 | Stop reason: HOSPADM

## 2019-08-24 RX ORDER — AMOXICILLIN 250 MG
1 CAPSULE ORAL 2 TIMES DAILY
Status: DISCONTINUED | OUTPATIENT
Start: 2019-08-24 | End: 2019-08-25 | Stop reason: HOSPADM

## 2019-08-24 RX ORDER — OXYTOCIN/0.9 % SODIUM CHLORIDE 30/500 ML
340 PLASTIC BAG, INJECTION (ML) INTRAVENOUS CONTINUOUS PRN
Status: DISCONTINUED | OUTPATIENT
Start: 2019-08-24 | End: 2019-08-25 | Stop reason: HOSPADM

## 2019-08-24 RX ORDER — OXYTOCIN/0.9 % SODIUM CHLORIDE 30/500 ML
100 PLASTIC BAG, INJECTION (ML) INTRAVENOUS CONTINUOUS
Status: DISCONTINUED | OUTPATIENT
Start: 2019-08-24 | End: 2019-08-25 | Stop reason: HOSPADM

## 2019-08-24 RX ORDER — NALOXONE HYDROCHLORIDE 0.4 MG/ML
.1-.4 INJECTION, SOLUTION INTRAMUSCULAR; INTRAVENOUS; SUBCUTANEOUS
Status: DISCONTINUED | OUTPATIENT
Start: 2019-08-24 | End: 2019-08-25 | Stop reason: HOSPADM

## 2019-08-24 RX ORDER — MISOPROSTOL 200 UG/1
TABLET ORAL
Status: DISCONTINUED
Start: 2019-08-24 | End: 2019-08-24 | Stop reason: WASHOUT

## 2019-08-24 RX ORDER — ACETAMINOPHEN 325 MG/1
650 TABLET ORAL EVERY 4 HOURS PRN
Status: DISCONTINUED | OUTPATIENT
Start: 2019-08-24 | End: 2019-08-25 | Stop reason: HOSPADM

## 2019-08-24 RX ORDER — OXYTOCIN 10 [USP'U]/ML
10 INJECTION, SOLUTION INTRAMUSCULAR; INTRAVENOUS
Status: DISCONTINUED | OUTPATIENT
Start: 2019-08-24 | End: 2019-08-25 | Stop reason: HOSPADM

## 2019-08-24 RX ORDER — AMOXICILLIN 250 MG
2 CAPSULE ORAL 2 TIMES DAILY
Status: DISCONTINUED | OUTPATIENT
Start: 2019-08-24 | End: 2019-08-25 | Stop reason: HOSPADM

## 2019-08-24 RX ORDER — LIDOCAINE 40 MG/G
CREAM TOPICAL
Status: DISCONTINUED | OUTPATIENT
Start: 2019-08-24 | End: 2019-08-24

## 2019-08-24 RX ORDER — HYDROCORTISONE 2.5 %
CREAM (GRAM) TOPICAL 3 TIMES DAILY PRN
Status: DISCONTINUED | OUTPATIENT
Start: 2019-08-24 | End: 2019-08-25 | Stop reason: HOSPADM

## 2019-08-24 RX ORDER — OXYTOCIN/0.9 % SODIUM CHLORIDE 30/500 ML
1-24 PLASTIC BAG, INJECTION (ML) INTRAVENOUS CONTINUOUS
Status: DISCONTINUED | OUTPATIENT
Start: 2019-08-24 | End: 2019-08-24

## 2019-08-24 RX ORDER — OXYTOCIN 10 [USP'U]/ML
INJECTION, SOLUTION INTRAMUSCULAR; INTRAVENOUS
Status: DISCONTINUED
Start: 2019-08-24 | End: 2019-08-24 | Stop reason: WASHOUT

## 2019-08-24 RX ORDER — LIDOCAINE HYDROCHLORIDE 10 MG/ML
INJECTION, SOLUTION EPIDURAL; INFILTRATION; INTRACAUDAL; PERINEURAL
Status: DISCONTINUED
Start: 2019-08-24 | End: 2019-08-24 | Stop reason: WASHOUT

## 2019-08-24 RX ORDER — BISACODYL 10 MG
10 SUPPOSITORY, RECTAL RECTAL DAILY PRN
Status: DISCONTINUED | OUTPATIENT
Start: 2019-08-26 | End: 2019-08-25 | Stop reason: HOSPADM

## 2019-08-24 RX ORDER — SODIUM CHLORIDE 9 MG/ML
INJECTION, SOLUTION INTRAVENOUS CONTINUOUS
Status: DISCONTINUED | OUTPATIENT
Start: 2019-08-24 | End: 2019-08-24

## 2019-08-24 RX ORDER — SODIUM CHLORIDE, SODIUM LACTATE, POTASSIUM CHLORIDE, CALCIUM CHLORIDE 600; 310; 30; 20 MG/100ML; MG/100ML; MG/100ML; MG/100ML
INJECTION, SOLUTION INTRAVENOUS CONTINUOUS
Status: DISCONTINUED | OUTPATIENT
Start: 2019-08-24 | End: 2019-08-24

## 2019-08-24 RX ADMIN — Medication 2 MILLI-UNITS/MIN: at 07:07

## 2019-08-24 RX ADMIN — SENNOSIDES AND DOCUSATE SODIUM 2 TABLET: 8.6; 5 TABLET ORAL at 21:00

## 2019-08-24 RX ADMIN — FENTANYL CITRATE 100 MCG: 50 INJECTION INTRAMUSCULAR; INTRAVENOUS at 16:29

## 2019-08-24 RX ADMIN — IBUPROFEN 800 MG: 800 TABLET, FILM COATED ORAL at 18:33

## 2019-08-24 RX ADMIN — Medication 340 ML/HR: at 18:06

## 2019-08-24 RX ADMIN — CETIRIZINE HYDROCHLORIDE 10 MG: 10 TABLET, FILM COATED ORAL at 09:32

## 2019-08-24 RX ADMIN — Medication 25 MCG: at 03:14

## 2019-08-24 RX ADMIN — OMEPRAZOLE 20 MG: 20 CAPSULE, DELAYED RELEASE ORAL at 21:00

## 2019-08-24 RX ADMIN — SODIUM CHLORIDE, POTASSIUM CHLORIDE, SODIUM LACTATE AND CALCIUM CHLORIDE: 600; 310; 30; 20 INJECTION, SOLUTION INTRAVENOUS at 14:49

## 2019-08-24 RX ADMIN — SODIUM CHLORIDE 250 ML: 9 INJECTION, SOLUTION INTRAVENOUS at 14:18

## 2019-08-24 RX ADMIN — SODIUM CHLORIDE, POTASSIUM CHLORIDE, SODIUM LACTATE AND CALCIUM CHLORIDE: 600; 310; 30; 20 INJECTION, SOLUTION INTRAVENOUS at 07:07

## 2019-08-24 RX ADMIN — CETIRIZINE HYDROCHLORIDE 10 MG: 10 TABLET, FILM COATED ORAL at 21:00

## 2019-08-24 RX ADMIN — OMEPRAZOLE 20 MG: 20 CAPSULE, DELAYED RELEASE ORAL at 09:36

## 2019-08-24 RX ADMIN — SODIUM CHLORIDE, POTASSIUM CHLORIDE, SODIUM LACTATE AND CALCIUM CHLORIDE 500 ML: 600; 310; 30; 20 INJECTION, SOLUTION INTRAVENOUS at 16:14

## 2019-08-24 RX ADMIN — SODIUM CHLORIDE: 9 INJECTION, SOLUTION INTRAVENOUS at 14:42

## 2019-08-24 RX ADMIN — FERROUS SULFATE TAB 325 MG (65 MG ELEMENTAL FE) 325 MG: 325 (65 FE) TAB at 09:32

## 2019-08-24 NOTE — PROVIDER NOTIFICATION
08/24/19 1721   Provider Notification   Provider Name/Title Dr. Chavez   Method of Notification At Bedside   Request Evaluate in Person   Notification Reason Decels   Dr. Chavez is at bedside evaluating pt with recurrent variable. Oxytocin is cut to half but the midwife Mindy instructed in the room to put back at its original rate. O2 therapy, fluid bolus, and possition change did not help the decels.

## 2019-08-24 NOTE — PROVIDER NOTIFICATION
08/24/19 1505   Provider Notification   Provider Name/Title Mindy   Method of Notification At Bedside   Request Evaluate in Person   Notification Reason Labor Status;Decels   pt is having variable decels, amniofusion running, but no improvement.

## 2019-08-24 NOTE — PROVIDER NOTIFICATION
08/24/19 1200   Provider Notification   Provider Name/Title SURAJ Guillen   Method of Notification In Department   Request Evaluate in Person   Notification Reason Decels;Status Update   pt is having decels.

## 2019-08-24 NOTE — PROGRESS NOTES
Labor Progress Note    S: Rested well overnight. Noted a small amount of [place] blood from khan when ambulating to the bathroom. Desires SVE.     O:  Blood pressure 126/65, pulse 76, temperature 98  F (36.7  C), temperature source Oral, resp. rate 18, last menstrual period 2018, not currently breastfeeding.  General appearance: comfortable.  Contractions: irregular and mild.  Palpate: mild.  Soft resting tone.   FHT: Baseline 135 with moderate variability. Accelerations present. No decelerations present.  ROM: not ruptured.  Pelvic exam: 4.5/ 50%/ Posterior/ soft/ -2, khan out  Guzman Score: 6    Pitocin- none,  Antibiotics- none    A:  34 year old  with IUP @ 41w1d IOL for postdates   Fetal Heart rate tracing category one  GBS- unknown. Culture pending. Previously negative.   Patient Active Problem List   Diagnosis     Encounter for supervision of other normal pregnancy     Post-dates pregnancy     Encounter for triage in pregnant patient     Supervision of normal intrauterine pregnancy in multigravida in third trimester     Environmental and seasonal allergies     Helicobacter pylori infection     History of postpartum depression, currently pregnant     Major depressive disorder, single episode, severe without psychotic features (H)     Bety     Vitamin D insufficiency     Supervision of other normal pregnancy, antepartum     Abnormal first trimester screen         P:  -Discussed r/b of pitocin for IOL. Pt consents to pitocin.   -Comfort measures prn  -Reassess in 2-3 hours, sooner prn.    Shirin Trinh CNM, APRN

## 2019-08-24 NOTE — PROVIDER NOTIFICATION
08/23/19 2025   Provider Notification   Provider Name/Title Shirin Trinh CNM   Method of Notification Electronic Page   Notification Reason Status Update   Pt next vg miso due at 8:45, ctx q2-6 and pt wanting to be checked before next dose is given. Do you want to come check her cervix? Plan per provider to come do SVE and evaluate for a khan.

## 2019-08-24 NOTE — PROVIDER NOTIFICATION
08/24/19 1617   Provider Notification   Provider Name/Title Mindy   Method of Notification At Bedside   Request Evaluate in Person   Notification Reason Decels;Labor Status;Uterine Activity   pt continous to have recurrent vaiables. Oxytocin halved, fluid bolus and Oxygen given. Position changed. Provider came into the room and instructed to return the pitocin from the original rate which was 12.

## 2019-08-24 NOTE — PLAN OF CARE
Pt received morphine/vistaril and rested comfortably overnight. Received 2nd ose of vaginal miso. S. Kleven SVE at 0630 this morning and was 4.5/50/-3. She removed khan bulb and started patient on pitocin at 2u. Report given to TRACY Simpson.

## 2019-08-24 NOTE — PLAN OF CARE
Patient being induced as she is post dates. She is walking in the hallway making it difficult to monitor FHT and contractions at time. Plan to continue efforts at monitoring so pitocin can be managed effectively.

## 2019-08-24 NOTE — PROVIDER NOTIFICATION
08/23/19 2147   Provider Notification   Provider Name/Title CIERA Trinh CNM   Method of Notification Electronic Page   Notification Reason Bleeding   Pt having red bleeding out of khan bulb tubing. dripping out as she walked to the bathroom. Plan per provider to continue to monitor and notify of further bleeding.

## 2019-08-24 NOTE — PROVIDER NOTIFICATION
08/24/19 1230   Provider Notification   Provider Name/Title SURAJ Guillen   Method of Notification At Bedside   Request Evaluate in Person   Notification Reason Status Update;Labor Status

## 2019-08-24 NOTE — PROGRESS NOTES
Labor Progress Note    S: Resting in bed with family at bedside for support. Feeling occasional mild contractions. Has been getting up to ambulate frequently. Tolerating PO nutrition and hydration.     O:  Blood pressure 117/65, pulse 76, temperature 98.3  F (36.8  C), temperature source Oral, resp. rate 16, last menstrual period 2018, not currently breastfeeding.  General appearance: comfortable.  Contractions: Every 2-6 minutes. 60-90 seconds duration.  Palpate: mild.  Soft resting tone.   FHT: Baseline 155 with normal variability. Accelerations present. No decelerations present.  ROM: not ruptured.   Pelvic exam: deferred  Pitocin- none,  Antibiotics- none  S/p PO miso x 2 and PV miso x 1    A:  34 year old  with IUP @ 41w0d IOL for post dates   Fetal Heart rate tracing category one  GBS- unknown. Previously negative ( 19)  Patient Active Problem List   Diagnosis     Encounter for supervision of other normal pregnancy     Post-dates pregnancy     Encounter for triage in pregnant patient     Supervision of normal intrauterine pregnancy in multigravida in third trimester     Environmental and seasonal allergies     Helicobacter pylori infection     History of postpartum depression, currently pregnant     Major depressive disorder, single episode, severe without psychotic features (H)     Bety     Vitamin D insufficiency     Supervision of other normal pregnancy, antepartum     Abnormal first trimester screen         P:  -continue PV miso for cervical ripening.  -therapeutic sleep prn overnight  -Comfort measures prn  -Reassess in 4-6 hours, sooner prn.    Shirin Trinh, LIANM, APRN      ADDENDUM 0156  Pt requesting SVE prior to next dose of PV miso.  SVE 1/30%/-3/posterior/medium  Discussed r/b of Khan for cervical ripening in addition to continued PV miso. Pt agreeable and khan placed with speculum without difficulty.   Will hold next dose of PV miso x 30 min to evaluate  uterine contractions following khan placement and continue q 4 PV dosing if contraction pattern and tracing allow.   Reassess in 4-6 hours, sooner prn.     ALEJA BlancasM

## 2019-08-24 NOTE — PROGRESS NOTES
Labor Progress Note    S: Sleeping soundly s/p morphine and vistaril. Khan remains in place.     O:  Blood pressure 115/71, pulse 76, temperature 98.2  F (36.8  C), temperature source Oral, resp. rate 16, last menstrual period 2018, not currently breastfeeding.  General appearance: comfortable.  Contractions: Every 2-4 minutes. 50-80 seconds duration.  Palpate: mild.  Soft resting tone.   FHT: Baseline 140 with moderate variability. Accelerations present. No decelerations present.  ROM: not ruptured.   Pelvic exam: deferred  Pitocin- none,  Antibiotics- none  Khan in place. Last dose of PV miso at 1644      A:  34 year old  with IUP @ 41w1d IOL for postdates   Fetal Heart rate tracing category one  GBS- uknown. Previously negative. Culture pending.  Patient Active Problem List   Diagnosis     Encounter for supervision of other normal pregnancy     Post-dates pregnancy     Encounter for triage in pregnant patient     Supervision of normal intrauterine pregnancy in multigravida in third trimester     Environmental and seasonal allergies     Helicobacter pylori infection     History of postpartum depression, currently pregnant     Major depressive disorder, single episode, severe without psychotic features (H)     Bety     Vitamin D insufficiency     Supervision of other normal pregnancy, antepartum     Abnormal first trimester screen       P:  -Continue cervical ripening with khan and PV miso as able  -Encourage rest overnight  -Comfort measures prn  -Reassess in 4-6 hours, sooner prn.    Shirin Trinh, SURAJ, APRN

## 2019-08-24 NOTE — PROVIDER NOTIFICATION
08/24/19 1457   Provider Notification   Provider Name/Title SURAJ Guillen   Method of Notification Electronic Page   Request Evaluate in Person   Notification Reason Decels;Other (Comment)  (IUPC not tracing)   provider attempted IUPC x3 but still not tracing well.

## 2019-08-24 NOTE — PLAN OF CARE
Labor Shift Note  Data: Contraction frequency q 2-5 minutes, palpate moderate. Fetal assessment category one.   Vitals:    19 1429 19 1540 19 2045   BP: 122/68 117/65 (!) 148/73 122/59   Pulse: 76      Resp: 16 16 16 16   Temp: 97.7  F (36.5  C) 98.3  F (36.8  C) 98.4  F (36.9  C)    TempSrc: Oral Oral Oral    . No intake/output data recorded..  Intact. Pt has bloody show  Signs and symptoms of infection absent.  Blood pressures WNL. Support persons present.  Interventions: Continue uterine/fetal assessment continuously. Vital Signs per order set. Comfort measures ambulation, support persons and position changes.  Medicated for none.  Plan: Anticipate . Provide labor/coping assistance as needed by patient and support person.  Observe for and notify care provider of indications of progressing labor, need for pain medications,  or signs of fetal/maternal compromise.

## 2019-08-24 NOTE — PROGRESS NOTES
[facility] Labor and Delivery Progress Note    Radha Quick MRN# [id number]   Age: 34 year old YOB: 1985         Subjective:   Radha is still not feeling contractions - she is questioning if her water broke.            Objective:     /56   Pulse 75   Temp 98.3  F (36.8  C) (Oral)   Resp 20   LMP 2018       Cervical Exam: 4-5 / 50% / -3      Position: Posterior BOW is not palpable.    Membranes: Intact     Fetal Heart Rate:    Monitor: external US    Variability: moderate (amplitude range 6 to 25 bpm)    Baseline Rate: normal range    Fetal Heart Rate Tracing: cat I    Contractions: q 2.5-4 minutes, lasting 60-70 seconds, mild to palpation.    Pitocin: 8 mu        Assessment:   Radha Quick is a 34 year old  who is 41w1d here with IOL for late term gestation.         Plan:   Labor induction with Pitocin  Continue to ambulate, remain upright, change positions.  Reviewed that it is not possible to determine if the BOW is ruptured at this time, no bag palpable - will continue to monitor.    Assess coping regularly.       Eri Guillen

## 2019-08-24 NOTE — PROVIDER NOTIFICATION
08/24/19 0613   Provider Notification   Provider Name/Title CIERA Trinh CNM   Method of Notification In Department   Request Evaluate - Remote   Notification Reason Decels   Notified Shirin Trinh CNM of fetal heart tracing with intermittent late decelerations. Patient in supine position. Repositioned to right lateral. Also notified CNCRUZ of patient with a small amount of bleeding when up to the bathroom. CNM will be in to evaluate.

## 2019-08-25 VITALS
TEMPERATURE: 98.1 F | RESPIRATION RATE: 18 BRPM | SYSTOLIC BLOOD PRESSURE: 122 MMHG | HEART RATE: 76 BPM | DIASTOLIC BLOOD PRESSURE: 73 MMHG

## 2019-08-25 PROBLEM — R01.1 HEART MURMUR: Status: ACTIVE | Noted: 2019-08-25

## 2019-08-25 LAB — HGB BLD-MCNC: 11.7 G/DL (ref 11.7–15.7)

## 2019-08-25 PROCEDURE — 85018 HEMOGLOBIN: CPT | Performed by: ADVANCED PRACTICE MIDWIFE

## 2019-08-25 PROCEDURE — 25000132 ZZH RX MED GY IP 250 OP 250 PS 637: Performed by: ADVANCED PRACTICE MIDWIFE

## 2019-08-25 PROCEDURE — 36415 COLL VENOUS BLD VENIPUNCTURE: CPT | Performed by: ADVANCED PRACTICE MIDWIFE

## 2019-08-25 PROCEDURE — 25000132 ZZH RX MED GY IP 250 OP 250 PS 637: Performed by: STUDENT IN AN ORGANIZED HEALTH CARE EDUCATION/TRAINING PROGRAM

## 2019-08-25 RX ADMIN — OMEPRAZOLE 20 MG: 20 CAPSULE, DELAYED RELEASE ORAL at 08:11

## 2019-08-25 RX ADMIN — FERROUS SULFATE TAB 325 MG (65 MG ELEMENTAL FE) 325 MG: 325 (65 FE) TAB at 08:11

## 2019-08-25 RX ADMIN — IBUPROFEN 800 MG: 800 TABLET, FILM COATED ORAL at 02:12

## 2019-08-25 RX ADMIN — CETIRIZINE HYDROCHLORIDE 10 MG: 10 TABLET, FILM COATED ORAL at 08:11

## 2019-08-25 RX ADMIN — ACETAMINOPHEN 650 MG: 325 TABLET ORAL at 16:49

## 2019-08-25 RX ADMIN — SENNOSIDES AND DOCUSATE SODIUM 2 TABLET: 8.6; 5 TABLET ORAL at 08:11

## 2019-08-25 RX ADMIN — IBUPROFEN 800 MG: 800 TABLET, FILM COATED ORAL at 08:11

## 2019-08-25 RX ADMIN — IBUPROFEN 800 MG: 800 TABLET, FILM COATED ORAL at 14:00

## 2019-08-25 NOTE — PLAN OF CARE
Mother and baby transferred to postpartum unit at 2030 via wheelchair after completion of immediate recovery period. Patient oriented to room and report given to TRACY Leslie who assumes patient care. Mother and baby bonding well and in stable condition upon transfer.

## 2019-08-25 NOTE — PROVIDER NOTIFICATION
08/24/19 1940   Vitals   BP (!) 142/65   Resp 18   Oximeter Heart Rate 88 bpm   Called Shirin Trinh CNM r/t two BP's >140's systolic in post delivery period. Shirin Trinh ordered HELLP labs as a precaution. Patient denies any s/s of pre-eclampsia at this time. Will continue to monitor closely and will report to PP RN upon transfer.

## 2019-08-25 NOTE — PLAN OF CARE
Patient's labor did not progressed well. Patient was having recurrent variable decels. Amniofussion, fluid bolus, O2 therapy, different positions changes, and cervical exams were all performed. Dr. Fraser was consulted and aware of the patient's status. I decreased the Oxytocin twice but the midwife Mindy instructed me to turn on back the original rate and to proceed with vaginal delivery. I have voiced my concern through my entire shift as patient was having recurrent variable decels. Patient progressed and was complete at 1754. After few pushes a viable baby girl was born at 1803. NICU was called in but came 2 minutes after birth. Both mom and baby are recovering well. Anticipate transfer to [facility].

## 2019-08-25 NOTE — PLAN OF CARE
Patient arrived to [facility] unit via wheelchair at 2030 ,with belongings, accompanied by spouse/ significant other, with infant in arms. Received report from Gwendolyn MOLINA  and checked bands. Unit and room orientation completd. Call light given; no concerns present at this time. Continue with plan of care.

## 2019-08-25 NOTE — DISCHARGE SUMMARY
[facility]  Post-partum Discharge Summary    Radha Quick MRN# [id number]   Age: 34 year old YOB: 1985     Date of Admission:  2019  Date of Discharge::  2019  Admitting Physician:  ALEJA Hollis CNM  Discharge Physician:  Godfrey Pavon MD     Home clinic: [facility]            Admission Diagnoses:   Maternity *TRENT: 2019 IOL  Supervision of normal intrauterine pregnancy in multigravida in third trimester   (normal spontaneous vaginal delivery)          Discharge Diagnosis:   Normal spontaneous vaginal delivery  Patient Active Problem List   Diagnosis     Encounter for supervision of other normal pregnancy     Post-dates pregnancy     Encounter for triage in pregnant patient     Supervision of normal intrauterine pregnancy in multigravida in third trimester     Environmental and seasonal allergies     Helicobacter pylori infection     History of postpartum depression, currently pregnant     Major depressive disorder, single episode, severe without psychotic features (H)     Bety     Vitamin D insufficiency     Supervision of other normal pregnancy, antepartum     Abnormal first trimester screen      (normal spontaneous vaginal delivery)     Heart murmur             Procedures:   Procedure(s): No additional procedures performed       No procedures performed during this admission           Medications Prior to Admission:     Medications Prior to Admission   Medication Sig Dispense Refill Last Dose     acetaminophen (TYLENOL) 325 MG tablet Take 650 mg by mouth every 4 hours as needed        cetirizine (ZYRTEC) 10 MG tablet Take 10 mg by mouth 2 times daily    2019 at Unknown time     docusate sodium (COLACE) 100 MG capsule Take 100 mg by mouth 2 times daily as needed for constipation        ferrous sulfate (FEROSUL) 325 (65 Fe) MG tablet Take 325 mg by mouth daily (with  breakfast)   8/22/2019 at Unknown time     ferrous sulfate (FEROSUL) 325 (65 Fe) MG tablet Take 325 mg by mouth daily        ibuprofen (ADVIL/MOTRIN) 200 MG tablet Take 400-600 mg by mouth every 6 hours as needed        olopatadine (PATANOL) 0.1 % ophthalmic solution Apply 1 drop to eye 2 times daily   8/22/2019 at Unknown time     omeprazole 20 MG tablet Take 20 mg by mouth 2 times daily   8/22/2019 at Unknown time     Prenatal vitamin  s (DIS) TABS Take 1 tablet by mouth daily. 200 tablet 1 8/22/2019 at Unknown time     vitamin D3 (CHOLECALCIFEROL) 2000 units (50 mcg) tablet Take 2,000 Units by mouth daily        order for [facility] order 1 each 0              Discharge Medications:     Current Discharge Medication List      CONTINUE these medications which have NOT CHANGED    Details   acetaminophen (TYLENOL) 325 MG tablet Take 650 mg by mouth every 4 hours as needed      cetirizine (ZYRTEC) 10 MG tablet Take 10 mg by mouth 2 times daily       docusate sodium (COLACE) 100 MG capsule Take 100 mg by mouth 2 times daily as needed for constipation      !! ferrous sulfate (FEROSUL) 325 (65 Fe) MG tablet Take 325 mg by mouth daily (with breakfast)      !! ferrous sulfate (FEROSUL) 325 (65 Fe) MG tablet Take 325 mg by mouth daily      ibuprofen (ADVIL/MOTRIN) 200 MG tablet Take 400-600 mg by mouth every 6 hours as needed      olopatadine (PATANOL) 0.1 % ophthalmic solution Apply 1 drop to eye 2 times daily      omeprazole 20 MG tablet Take 20 mg by mouth 2 times daily      Prenatal vitamin  s (DIS) TABS Take 1 tablet by mouth daily.  Qty: 200 tablet, Refills: 1    Associated Diagnoses: Supervision of other normal pregnancy      vitamin D3 (CHOLECALCIFEROL) 2000 units (50 mcg) tablet Take 2,000 Units by mouth daily      order for [facility] Maternity Belt order  Qty: 1 each, Refills: 0    Associated Diagnoses: Pregnancy, incidental       !! - Potential duplicate medications found. Please discuss with provider.                 Consultations:   No consultations were requested during this admission          Brief History of Labor:   On 2019 at 1803, this 34 year old year old  under None analgesia delivered a viable Female infant weighing 6 pounds 15 ounces with APGAR scores below:  APGARs 1 Min 5Min 10Min   Totals:  8  9              Hospital Course (HPI):   The patient's hospital course was unremarkable.  On discharge, her pain was well controlled. Vaginal bleeding is decreased.  Voiding without difficulty.  Ambulating well and tolerating a normal diet.  No fever. Breast feeding going well.  Infant is stable.  She was discharged on post-partum day #1. Contraception plan: Paragard IUD. Post partum depression education was provided.         D/C Physical Exam:     Vitals:    19 2038 19 0056 19 0804   BP: 134/68 124/69 102/61 120/72   Pulse:  79 76 76   Resp: 17 20 18 16   Temp:  97.8  F (36.6  C) 98  F (36.7  C) 98  F (36.7  C)   TempSrc:  Oral Oral Oral       GENERAL:         healthy, alert and no distress  RESPIRATORY:   No increased work of breathing, good air exchange, clear to auscultation bilaterally, no crackles or wheezing   CARDIOVASCULAR:   Normal apical impulse, regular rate and rhythm, normal S1 and S2, no S3 or S4, and soft holo-systolic murmur heard best at left upper sternal border   ABDOMEN:   No scars, normal bowel sounds, soft, non-distended, non-tender, no masses palpated, no hepatosplenomegally  Fundal height at level of umbilicus + 1 cm.  Firm and non tender.   EXTREMITIES:         No edema, non-tender to palpation    Post-partum hemoglobin:   Hemoglobin   Date Value Ref Range Status   2019 11.7 11.7 - 15.7 g/dL Final           Discharge Instructions and Follow-Up:   Discharge diet: Regular   Discharge activity: Activity as tolerated   Discharge follow-up: Follow up with primary care provider in 6 weeks   Wound care: Drink plenty of fluids  Ice to area for comfort      # Discharge Pain Plan:   - During her hospitalization, Radha experienced pain due to delivery.  The pain plan for discharge was discussed with Radha and the plan was created in a collaborative fashion.    - Pharmacologic adjuvants:  NSAIDs           Discharge Disposition:   Discharged to home        Possible soft systolic murmur heard at pulmonic area; possible flow murmur given fluid shifts through delivery. Would readdress at post-partum visit and consider echo if still present.    Mother desires IUD or NexplanonNancy s GYN clinic referral placed. Please Schedule  Yes   Family phone number verified in EPIC and is correct Yes         Mario Ojeda MD

## 2019-08-25 NOTE — L&D DELIVERY NOTE
OB Vaginal Delivery Note    Rdaha Quick MRN# [id number]   Age: 34 year old YOB: 1985     GA: 41w1d  GP:   Labor Complications: Fetal Intolerance;Dysfunctional Labor   EBL:    mL  Delivery QBL: 50 mL  Delivery Type: Vaginal, Spontaneous   ROM to Delivery Time: (Delivered) Hours: 4 Minutes:30  [place] Weight: 3.147 kg (6 lb 15 oz)    1 Minute 5 Minute 10 Minute   Apgar Totals:                   Labor Course:  Pt presented to unit for an IOL for 41 wks gestation. She was administered several doses of oral and vaginal misoprostol prior to receiving a khan catheter for cervical ripening. Khan was out at 0630 at which time she was found to be 4cm dilated. She remained 4 cm She was not in labor until around 1130 on 19 at which point she began to experience contractions more intensely and FHTs at that time began to show variable decelerations. The FHT strip continued to be concerning for recurrent variable decelerations from that time with moderate variability. Dr Fraser aware of strip and consulted assessed pt several times throughtout labor course. Significant cervical change finally noted at 1945 - pt 9cm and became C/C/0 at 1754.     Delivery Details:  Radha Quick, a 34 year old  female delivered a viable infant with apgars of 8    and 9   . Patient was fully dilated and pushing after    6 hours   24 minutes in active labor. Delivery was via vaginal, spontaneous  to a sterile field under intravenous regional  anesthesia. Infant delivered in vertex  left  occiput  transverse  position. Anterior and posterior shoulders delivered without difficulty. The cord was clamped x4 for cord gasses and cut once by their oldest son and once by CNM and 3    vessles were noted. Cord blood was obtained in routine fashion with the following disposition:   hospital disposal. NICU called to delivery for Cat II tracing however vigorous baby out when they entered and so they left.  "    Cord complications:     Placenta delivered at 2019  6:11 PM . Placental disposition was Hospital disposal . Fundal massage performed and fundus found to be firm.     Episiotomy: none    Perineum, vagina, cervix were inspected, and the following lacerations were noted: none  Perineal lacerations: none                   Excellent hemostasis was noted. Needle count correct. Infant and patient in delivery room in good and stable condition.      Labor Event Times    Labor onset date:  19 Onset time:  11:30 AM   Dilation complete date:  19 Complete time:   5:54 PM   Start pushing date/time:  2019 1755      Labor Events    Labor Type:  Induction  Predominate monitoring during 1st stage:  continuous electronic fetal monitoring     Rupture date/time: 19 1333   Rupture type:  Artificial Rupture of Membranes  Fluid color:  Clear  Fluid odor:  Normal     1:1 continuous labor support provided by?:  RN       Delivery/Placenta Date and Time    Delivery Date:  19 Delivery Time:   6:03 PM   Placenta Date/Time:  2019  6:11 PM  Oxytocin given at the time of delivery:  after delivery of baby     Vaginal Counts     Initial count performed by 2 team members:   Two Team Members   bryn Guillen       [employer] S[employer]   Initial counts 2 0 5    Added to count 0 0 0    Final counts 2 0 5    Placed during labor Accounted for at the end of labor   Yes Yes   Yes Yes   No NA    Final count performed by 2 team members:   Two Team Members   bryn Guillen      Final count correct?:  Yes      Measurements    Weight:  6 lb 15 oz Length:  1' 8\"   Head circumference:  34.9 cm       Labor Events and Shoulder Dystocia    Fetal Tracing Prior to Delivery:  Category 2  Fetal Tracing Comments:  recurrent variable decelerations.  Shoulder dystocia present?:  Neg     Delivery (Maternal) (Provider to Complete) (226795)    Episiotomy:  None  Perineal " lacerations:  None    Vaginal laceration?:  No    Cervical laceration?:  No       Blood Loss  Mother: Radha Quick #[id number]   Start of Mother's Information    IO Blood Loss  08/24/19 1130 - 08/24/19 2006    Delivery QBL (mL) Hospital Encounter 50 mL    Total  50 mL         End of Mother's Information  Mother: Radha Quick #[id number]         Delivery - Provider to Complete ([id number])    Delivering clinician:  Eri Guillen CNM CNM Care:  Exclusive CNM care in labor  Attempted Delivery Types (Choose all that apply):  Spontaneous Vaginal Delivery  Delivery Type (Choose the 1 that will go to the Birth History):  Vaginal, Spontaneous          Placenta    Delayed Cord Clamping:  Done  Date/Time:  8/24/2019  6:11 PM  Removal:  Spontaneous  Comments:  via Chicas, apparently intact  Disposition:  Hospital disposal     Anesthesia    Method:  INTRAVENOUS REGIONAL          Presentation and Position    Presentation:  Vertex  Position:  Left Occiput Transverse         Eri Guillen CNM

## 2019-08-25 NOTE — PROVIDER NOTIFICATION
08/24/19 1650   Provider Notification   Provider Name/Title Dr. Morfin   Method of Notification At Bedside   Request Evaluate in Person   Notification Reason Labor Status;Decels;Status Update   Dr. Chavez is here to evaluate pt for variable decels. Performed cervix exam. Will reevaluate in a little bit.

## 2019-08-25 NOTE — PLAN OF CARE
Vital signs within normal limits. Postpartum checks within normal limits. Patient eating and drinking normally. Patient able to empty bladder independently and is up ambulating. Patient performing self cares and is able to care for infant. Cramping is well controlled with hot packs and ibuprofen.  Positive attachment behaviors observed with infant. Support person present. Will continue with plan of care.

## 2019-08-25 NOTE — PLAN OF CARE
Data: Vital signs within normal limits. Postpartum checks within normal limits - see flow record. Patient eating and drinking normally. Patient able to empty bladder independently and is up ambulating. No apparent signs of infection. Perineum healing well. Patient performing self cares and is able to care for infant.  Action: Patient medicated during the shift for cramping. See MAR. Patient reassessed within 1 hour after each medication and pain was improved - patient stated she was comfortable. Patient education done, see flow record. Encouraged to watch [facility] videos.  Response: Positive attachment behaviors observed with infant. Support person (FOB and pt's mom) present.   Plan: Anticipate discharge this evening after 24 hours.

## 2019-08-26 NOTE — PLAN OF CARE
Pt ready to discharge home. Pt stable. Independent with self and infant cares. Discharge instructions reviewed with patient. Pt verbalized understanding of d/c instructions and states all questions answered. Reports watching all videos, denies further concerns.Plan for follow-up in clinic in 6 weeks.

## 2019-10-15 ENCOUNTER — OFFICE VISIT (OUTPATIENT)
Dept: FAMILY MEDICINE | Facility: CLINIC | Age: 34
End: 2019-10-15
Payer: COMMERCIAL

## 2019-10-15 DIAGNOSIS — Z13.9 SCREENING FOR CONDITION: ICD-10-CM

## 2019-10-15 DIAGNOSIS — Z30.430 ENCOUNTER FOR IUD INSERTION: Primary | ICD-10-CM

## 2019-10-15 RX ADMIN — COPPER 1 EACH: 313.4 INTRAUTERINE DEVICE INTRAUTERINE at 15:42

## 2019-10-16 LAB — HCG UR QL: NEGATIVE

## 2019-10-16 RX ORDER — COPPER 313.4 MG/1
1 INTRAUTERINE DEVICE INTRAUTERINE ONCE
Status: COMPLETED
Start: 2019-10-15 | End: 2019-10-15

## 2019-10-16 NOTE — PROGRESS NOTES
[facility]  IUD Insertion Note    Radha Quick is a patient of Dr. Saenz, [facility] here for an IUD insertion   Indication: contraception    Counseling: Discussed potential side effects of Paraguard, including increased bleeding and cramping, as well as the Mirena, including unpredictable spotting and amenorrhea.  Patient aware to check for strings every month.    Consent: Affirmation of informed consent was signed and scanned into the medical record. Risks, benefits and alternatives were discussed including risk of infection, bleeding and small risk of uterine perforation.  Patient's questions were elicited and answered.   Procedure safety checklist was completed:  Yes  Time Out (Pause for the Cause) completed: Yes    Labs: UPT negative    Technique:   Patient was premedicated with ibuprofen:   No  Uterine position was confirmed by bimanual exam: Yes   Using a sterile speculum and equipment, the cervix was examined.     The cervix was cleansed with Betadine prep. A tenaculum was applied to the cervix with tension to straighten the cervical canal.  The uterus was sounded to 7 cm.  A ParaGard IUD was inserted in the usual fashion and strings trimmed 3 cm below the cervix.  EBL: minimal  Complications: Small cervical bleeding with tenaculum removal, resolved with pressure.  Tolerance: Pt tolerated procedure well and was in stable condition.  She was observed in the clinic for 15 min.    Follow up: Pt was instructed to call if fever, chills, heavy bleeding, severe cramping or foul smelling discharge. May take 600 mg ibuprofen QID prn for mild cramping.  She was advised to check the IUD strings monthly.  Recommended that she return in 1 month for IUD string check.    Resident: Hugo Mcnamara DO  Faculty: Diane Hylton MD present for and supervised this entire procedure.

## 2019-10-18 VITALS
SYSTOLIC BLOOD PRESSURE: 114 MMHG | OXYGEN SATURATION: 97 % | DIASTOLIC BLOOD PRESSURE: 76 MMHG | TEMPERATURE: 98.1 F | WEIGHT: 169 LBS | HEART RATE: 72 BPM

## 2019-10-23 PROBLEM — Z36.89 ENCOUNTER FOR TRIAGE IN PREGNANT PATIENT: Status: RESOLVED | Noted: 2019-03-27 | Resolved: 2019-10-23

## 2019-10-23 PROBLEM — O99.891 HISTORY OF POSTPARTUM DEPRESSION, CURRENTLY PREGNANT: Status: RESOLVED | Noted: 2019-07-02 | Resolved: 2019-10-23

## 2019-10-23 PROBLEM — Z34.80 SUPERVISION OF OTHER NORMAL PREGNANCY, ANTEPARTUM: Status: RESOLVED | Noted: 2018-12-26 | Resolved: 2019-10-23

## 2019-10-23 PROBLEM — Z34.83 SUPERVISION OF NORMAL INTRAUTERINE PREGNANCY IN MULTIGRAVIDA IN THIRD TRIMESTER: Status: RESOLVED | Noted: 2019-08-23 | Resolved: 2019-10-23

## 2019-10-23 PROBLEM — Z97.5 IUD (INTRAUTERINE DEVICE) IN PLACE: Status: ACTIVE | Noted: 2019-10-23

## 2019-10-23 PROBLEM — Z86.59 HISTORY OF POSTPARTUM DEPRESSION, CURRENTLY PREGNANT: Status: RESOLVED | Noted: 2019-07-02 | Resolved: 2019-10-23

## 2019-10-23 PROBLEM — O28.9 ABNORMAL FIRST TRIMESTER SCREEN: Status: RESOLVED | Noted: 2019-03-01 | Resolved: 2019-10-23

## 2019-10-23 NOTE — PROGRESS NOTES
Preceptor Attestation:   Patient seen, evaluated and discussed with the resident. I was present for and supervised the entire procedure. I have verified the content of the note, which accurately reflects my assessment of the patient and the plan of care.   Supervising Physician:  Diane Hylton MD.

## 2020-03-01 ENCOUNTER — HEALTH MAINTENANCE LETTER (OUTPATIENT)
Age: 35
End: 2020-03-01

## 2020-12-13 ENCOUNTER — HEALTH MAINTENANCE LETTER (OUTPATIENT)
Age: 35
End: 2020-12-13

## 2021-04-17 ENCOUNTER — HEALTH MAINTENANCE LETTER (OUTPATIENT)
Age: 36
End: 2021-04-17

## 2021-06-24 ENCOUNTER — MEDICAL CORRESPONDENCE (OUTPATIENT)
Dept: HEALTH INFORMATION MANAGEMENT | Facility: CLINIC | Age: 36
End: 2021-06-24

## 2021-07-21 NOTE — PROGRESS NOTES
"SUBJECTIVE: Radha Quick is a 36 year old  female, requests placement of Paragard IUD placement.   Radha has had a Paragard IUD in the past; it expelled in 2021. She noted the strings were longer than expected; she had been being treated for recurrent vaginitis prior to this.  Since that time, she used depo provera for contraception but did not like the bleeding pattern, followed by COCs. She stopped the COCs after her last menstrual period (Patient's last menstrual period was 2021.) and has been using condoms since then. Denies unprotected intercourse since LMP.     Patient reports her menstrual bleeding was manageable with Paragard IUD in the past.     Verification of Procedure:  Just before the procedure begans through verbal and active participation of team members, I verified:     Initials   Patient Name SLD   Patient  SLD   Procedure to be performed SLD     Consent:  Risks, benefits of treatment, and alternative options for contraception were discussed.  Patient's questions were elicited and answered.  Written consent was obtained and scanned into medical record.     OBJECTIVE: /69   Pulse 84   Ht 1.549 m (5' 1\")   Wt 71.4 kg (157 lb 8 oz)   LMP 2021   BMI 29.76 kg/m      Pelvic Exam:  EG/BUS: Normal genital architecture without lesions, erythema or abnormal secretions. Bartholin's, Urethra, [place]'s glands are normal.   Vagina: moist, pink, rugae with creamy, white and odorlesssecretions  Cervix: Multiparous, and pink, moist, closed, without lesion or CMT  Uterus: anteverted,  and small, smooth, firm, mobile w/o pain   Adnexa: Within normal limits and No masses, nodularity, tenderness  Rectum: anus normal      PROCEDURE NOTE  --  ParaGard Insertion    Reason for Insertion:  contraception.    Pregnancy test: Negative    Counseling:  Patient counseled on efficacy, benefits, risks, potential side effects of IUD.  Insertion procedure and risk of infection, " perforation, and spontaneous expulsion reviewed.   Advised to plan removal and/or replacement of IUD in 10-12 years from today or when desired.       Radha was pre-medicated with ibuprofen prior to beginning the procedure.      Under sterile technique, cervix was visualized with a medium Bryce speculum and prepped with Betadine solution.  A tenaculum was placed on the anterior lip of the cervix. The uterus was sounded to 8 cm. The ParaGard IUD insertion apparatus was prepared and placed through the cervix without significant resistance and deployed at the fundus in the usual fashion. The strings were trimmed 3 cm from the external os.      Device Lot #: [id number]  Device Expiration Date: Jan 2027     EBL:  Minimal     Complications: None      Radha Quick tolerated procedure well.    PLAN:    Offered STD testing, pt declined.   Written information on IUD use reviewed and given.  Symptoms to report reviewed. To report heavy bleeding, severe cramping, or abnormal vaginal discharge.  May take Ibuprofen 400-800 mg PO TID PRN or Naproxen 500 mg PO BID for cramping. Reminded to check for IUD strings every month. Patient plans to return to her PCP at [facility] for IUD string check and annual exam in 4-6 weeks.   Radha Quick  verbalized understanding of instructions.    I, Eri Sneed, completed the PFSH and ROS. I then acted as a scribe for KWESI Blank, for the remainder of the visit.  Eri Sneed BSN RN DNP Student [facility] Specialty    I agree with the PFSH and ROS as completed by the KWESI Student, except for changes made by me.  The remainder of the encounter was performed by me and scribed by the KWESI Student.  The scribed note accurately reflects my personal services and decisions made by me.  Shirin Chua DNP, APRN, KWESI

## 2021-07-22 ENCOUNTER — OFFICE VISIT (OUTPATIENT)
Dept: OBGYN | Facility: CLINIC | Age: 36
End: 2021-07-22
Attending: NURSE PRACTITIONER
Payer: COMMERCIAL

## 2021-07-22 VITALS
HEART RATE: 84 BPM | BODY MASS INDEX: 29.74 KG/M2 | SYSTOLIC BLOOD PRESSURE: 114 MMHG | DIASTOLIC BLOOD PRESSURE: 69 MMHG | HEIGHT: 61 IN | WEIGHT: 157.5 LBS

## 2021-07-22 DIAGNOSIS — Z30.430 ENCOUNTER FOR INSERTION OF INTRAUTERINE CONTRACEPTIVE DEVICE: Primary | ICD-10-CM

## 2021-07-22 LAB
HCG UR QL: NEGATIVE
INTERNAL QC OK POCT: NORMAL

## 2021-07-22 PROCEDURE — G0463 HOSPITAL OUTPT CLINIC VISIT: HCPCS

## 2021-07-22 PROCEDURE — 250N000009 HC RX 250: Performed by: NURSE PRACTITIONER

## 2021-07-22 PROCEDURE — 58300 INSERT INTRAUTERINE DEVICE: CPT | Performed by: NURSE PRACTITIONER

## 2021-07-22 PROCEDURE — 81025 URINE PREGNANCY TEST: CPT | Performed by: NURSE PRACTITIONER

## 2021-07-22 RX ORDER — COPPER 313.4 MG/1
1 INTRAUTERINE DEVICE INTRAUTERINE ONCE
COMMUNITY

## 2021-07-22 RX ORDER — COPPER 313.4 MG/1
1 INTRAUTERINE DEVICE INTRAUTERINE ONCE
Status: COMPLETED
Start: 2021-07-22 | End: 2021-07-22

## 2021-07-22 RX ADMIN — COPPER 1 EACH: 313.4 INTRAUTERINE DEVICE INTRAUTERINE at 09:30

## 2021-07-22 ASSESSMENT — MIFFLIN-ST. JEOR: SCORE: 1351.8

## 2021-07-22 ASSESSMENT — ANXIETY QUESTIONNAIRES
3. WORRYING TOO MUCH ABOUT DIFFERENT THINGS: NOT AT ALL
7. FEELING AFRAID AS IF SOMETHING AWFUL MIGHT HAPPEN: NOT AT ALL
GAD7 TOTAL SCORE: 0
1. FEELING NERVOUS, ANXIOUS, OR ON EDGE: NOT AT ALL
2. NOT BEING ABLE TO STOP OR CONTROL WORRYING: NOT AT ALL
5. BEING SO RESTLESS THAT IT IS HARD TO SIT STILL: NOT AT ALL
6. BECOMING EASILY ANNOYED OR IRRITABLE: NOT AT ALL

## 2021-07-22 ASSESSMENT — PATIENT HEALTH QUESTIONNAIRE - PHQ9
5. POOR APPETITE OR OVEREATING: NOT AT ALL
SUM OF ALL RESPONSES TO PHQ QUESTIONS 1-9: 2

## 2021-07-22 NOTE — LETTER
"2021       RE: Radha Quick  [address]  [facility] [address]     Dear Colleague,    Thank you for referring your patient, Radha Quick, to the [facility] [place] at [facility]. Please see a copy of my visit note below.    SUBJECTIVE: Radha Quick is a 36 year old  female, requests placement of Paragard IUD placement.   Radha has had a Paragard IUD in the past; it expelled in 2021. She noted the strings were longer than expected; she had been being treated for recurrent vaginitis prior to this.  Since that time, she used depo provera for contraception but did not like the bleeding pattern, followed by COCs. She stopped the COCs after her last menstrual period (Patient's last menstrual period was 2021.) and has been using condoms since then. Denies unprotected intercourse since LMP.     Patient reports her menstrual bleeding was manageable with Paragard IUD in the past.     Verification of Procedure:  Just before the procedure begans through verbal and active participation of team members, I verified:     Initials   Patient Name SLD   Patient  SLD   Procedure to be performed SLD     Consent:  Risks, benefits of treatment, and alternative options for contraception were discussed.  Patient's questions were elicited and answered.  Written consent was obtained and scanned into medical record.     OBJECTIVE: /69   Pulse 84   Ht 1.549 m (5' 1\")   Wt 71.4 kg (157 lb 8 oz)   LMP 2021   BMI 29.76 kg/m      Pelvic Exam:  EG/BUS: Normal genital architecture without lesions, erythema or abnormal secretions. Bartholin's, Urethra, [place]'s glands are normal.   Vagina: moist, pink, rugae with creamy, white and odorlesssecretions  Cervix: Multiparous, and pink, moist, closed, without lesion or CMT  Uterus: anteverted,  and small, smooth, firm, mobile w/o pain   Adnexa: Within normal " limits and No masses, nodularity, tenderness  Rectum: anus normal      PROCEDURE NOTE  --  ParaGard Insertion    Reason for Insertion:  contraception.    Pregnancy test: Negative    Counseling:  Patient counseled on efficacy, benefits, risks, potential side effects of IUD.  Insertion procedure and risk of infection, perforation, and spontaneous expulsion reviewed.   Advised to plan removal and/or replacement of IUD in 10-12 years from today or when desired.       Radha was pre-medicated with ibuprofen prior to beginning the procedure.      Under sterile technique, cervix was visualized with a medium Bryce speculum and prepped with Betadine solution.  A tenaculum was placed on the anterior lip of the cervix. The uterus was sounded to 8 cm. The ParaGard IUD insertion apparatus was prepared and placed through the cervix without significant resistance and deployed at the fundus in the usual fashion. The strings were trimmed 3 cm from the external os.      Device Lot #: [id number]  Device Expiration Date: Jan 2027     EBL:  Minimal     Complications: None      Radha Quick tolerated procedure well.    PLAN:    Offered STD testing, pt declined.   Written information on IUD use reviewed and given.  Symptoms to report reviewed. To report heavy bleeding, severe cramping, or abnormal vaginal discharge.  May take Ibuprofen 400-800 mg PO TID PRN or Naproxen 500 mg PO BID for cramping. Reminded to check for IUD strings every month. Patient plans to return to her PCP at [facility] for IUD string check and annual exam in 4-6 weeks.   Radha Quick  verbalized understanding of instructions.    I, Eri Sneed, completed the PFSH and ROS. I then acted as a scribe for KWESI Blank, for the remainder of the visit.  Eri Sneed BSN RN DNP Student [facility] Specialty    I agree with the PFSH and ROS as completed by the KWESI Student, except for changes made by me.  The remainder of the encounter  was performed by me and scribed by the NP Student.  The scribed note accurately reflects my personal services and decisions made by me.  Shirin Chua, DNP, APRN, AI

## 2021-07-23 ASSESSMENT — ANXIETY QUESTIONNAIRES: GAD7 TOTAL SCORE: 0

## 2021-08-04 ENCOUNTER — TELEPHONE (OUTPATIENT)
Dept: OBGYN | Facility: CLINIC | Age: 36
End: 2021-08-04

## 2021-08-04 NOTE — TELEPHONE ENCOUNTER
Discussed with the pt that it is normal to have bleeding for up to a month.  This writer verbalized bleeding precautions and when to seek help.  Pt verbalized understanding and agreed to plan.

## 2021-08-04 NOTE — TELEPHONE ENCOUNTER
----- Message from Chiara Pinto RN sent at 8/4/2021  4:08 PM CDT -----  Regarding: Started OCP, now having heavy period for 2 weeks  Should she be seen?

## 2021-08-26 ENCOUNTER — TELEPHONE (OUTPATIENT)
Dept: OBGYN | Facility: CLINIC | Age: 36
End: 2021-08-26

## 2021-08-26 DIAGNOSIS — Z30.430 ENCOUNTER FOR INSERTION OF INTRAUTERINE CONTRACEPTIVE DEVICE: Primary | ICD-10-CM

## 2021-08-26 NOTE — TELEPHONE ENCOUNTER
Patient called RN line, states she had an IUD placed 7/22 and she has been spotting and having a period ever since, (has been about a month). Patient states she wears a pad daily and it is spotting throughout the day, sometimes more of a medium flow. Patient wondering if it should be removed, she states she has had an IUD in the past and usually in one week she feels good with no period. Patient can feel the strings and feels it is in good place. Given all bleeding precautions and patient is agreeable.     Routing to NP for review.

## 2021-08-26 NOTE — TELEPHONE ENCOUNTER
Called and spoke to patient, agreeable to plan for ultrasound. Scheduled appointment for ultrasound IUD check on Thursday.

## 2021-09-02 ENCOUNTER — TELEPHONE (OUTPATIENT)
Dept: OBGYN | Facility: CLINIC | Age: 36
End: 2021-09-02

## 2021-09-02 ENCOUNTER — ANCILLARY PROCEDURE (OUTPATIENT)
Dept: ULTRASOUND IMAGING | Facility: CLINIC | Age: 36
End: 2021-09-02
Attending: NURSE PRACTITIONER
Payer: COMMERCIAL

## 2021-09-02 DIAGNOSIS — N83.201 RIGHT OVARIAN CYST: Primary | ICD-10-CM

## 2021-09-02 DIAGNOSIS — Z30.430 ENCOUNTER FOR INSERTION OF INTRAUTERINE CONTRACEPTIVE DEVICE: ICD-10-CM

## 2021-09-02 PROCEDURE — 76830 TRANSVAGINAL US NON-OB: CPT

## 2021-09-02 PROCEDURE — 76830 TRANSVAGINAL US NON-OB: CPT | Mod: 26 | Performed by: OBSTETRICS & GYNECOLOGY

## 2021-09-02 NOTE — TELEPHONE ENCOUNTER
Notified patient of her ultrasound results showing normal uterus and correct IUD position. Incidental finding of 3.2cm ovarian cyst. Recommended follow-up ultrasound in 3-4 months.    Pt states her vaginal bleeding seems to be resolving. Now only occurring every couple days. Expecting menses next week. Recommended pt schedule clinic visit if bleeding does not resolve / normalize after next menses. Pt expressed understanding and agreement with the plan for care.    Shirin Chua, DNP, APRN, WHNP

## 2021-09-26 ENCOUNTER — HEALTH MAINTENANCE LETTER (OUTPATIENT)
Age: 36
End: 2021-09-26

## 2021-10-21 ENCOUNTER — TELEPHONE (OUTPATIENT)
Dept: OBGYN | Facility: CLINIC | Age: 36
End: 2021-10-21

## 2021-10-21 NOTE — TELEPHONE ENCOUNTER
Message received from patient regarding questions about birth control.    Called and spoke with patient. States she had Paraguard IUD placed in 05/2021. States she occasionally is having breakthrough spotting between cycles. Advised that this is common in the first several months after IUD placement. Pt denies pelvic pain/cramping, feels is in correct place. Had US confirming placement 09/02/2021. Pt would like to wait another cycle to see if this continues to be a good option for her. Encouraged pt to reach out if she feels she would like an appointment to discuss another option. Pt verbalized understanding and agreement.

## 2021-11-21 ENCOUNTER — HEALTH MAINTENANCE LETTER (OUTPATIENT)
Age: 36
End: 2021-11-21

## 2021-12-20 ENCOUNTER — TELEPHONE (OUTPATIENT)
Dept: OBGYN | Facility: CLINIC | Age: 36
End: 2021-12-20
Payer: COMMERCIAL

## 2021-12-20 NOTE — TELEPHONE ENCOUNTER
[facility] Call Center    Phone Message    May a detailed message be left on voicemail: yes     Reason for Call: Patient would like to speak with someone in regards to her IUD that was placed in May.  She states her current cycle has been going on for 2.5 weeks now.  Please reach out to patient.    Action Taken: Message routed to:  [facility]    Travel Screening: Not Applicable

## 2021-12-21 NOTE — TELEPHONE ENCOUNTER
Called and spoke with patient. She reports breakthrough bleeding with every cycle and extended bleeding with every cycle since Paraguard placement. Pt is unhappy with bleeding and would like to have it removed and discuss other options.     Scheduled with Dr. Song 1/5/22 at 0915, pt confirmed availability.     Reviewed bleeding precautions and when to call/present to ED for evaluation. Pt verbalized understanding and agreement, denied further questions/concerns.

## 2022-05-08 ENCOUNTER — HEALTH MAINTENANCE LETTER (OUTPATIENT)
Age: 37
End: 2022-05-08

## 2023-01-14 ENCOUNTER — HEALTH MAINTENANCE LETTER (OUTPATIENT)
Age: 38
End: 2023-01-14

## 2023-06-02 ENCOUNTER — HEALTH MAINTENANCE LETTER (OUTPATIENT)
Age: 38
End: 2023-06-02

## 2024-06-30 ENCOUNTER — HEALTH MAINTENANCE LETTER (OUTPATIENT)
Age: 39
End: 2024-06-30

## 2024-10-15 ENCOUNTER — TRANSCRIBE ORDERS (OUTPATIENT)
Dept: OTHER | Age: 39
End: 2024-10-15

## 2024-10-15 DIAGNOSIS — J30.89 ENVIRONMENTAL AND SEASONAL ALLERGIES: Primary | ICD-10-CM

## 2025-03-10 ENCOUNTER — MEDICAL CORRESPONDENCE (OUTPATIENT)
Dept: HEALTH INFORMATION MANAGEMENT | Facility: CLINIC | Age: 40
End: 2025-03-10
Payer: COMMERCIAL

## 2025-03-11 ENCOUNTER — TRANSCRIBE ORDERS (OUTPATIENT)
Dept: OTHER | Age: 40
End: 2025-03-11

## 2025-03-11 DIAGNOSIS — R21 RASH OF FACE: Primary | ICD-10-CM
